# Patient Record
Sex: FEMALE | Race: BLACK OR AFRICAN AMERICAN | Employment: FULL TIME | URBAN - METROPOLITAN AREA
[De-identification: names, ages, dates, MRNs, and addresses within clinical notes are randomized per-mention and may not be internally consistent; named-entity substitution may affect disease eponyms.]

---

## 2017-01-13 ENCOUNTER — HISTORICAL (OUTPATIENT)
Dept: LAB | Facility: HOSPITAL | Age: 32
End: 2017-01-13

## 2017-04-21 ENCOUNTER — HISTORICAL (OUTPATIENT)
Dept: LAB | Facility: HOSPITAL | Age: 32
End: 2017-04-21

## 2017-08-16 ENCOUNTER — HISTORICAL (OUTPATIENT)
Dept: LAB | Facility: HOSPITAL | Age: 32
End: 2017-08-16

## 2017-08-16 LAB
ABS NEUT (OLG): 4.71 X10(3)/MCL (ref 2.1–9.2)
ALBUMIN SERPL-MCNC: 3.9 GM/DL (ref 3.4–5)
ALBUMIN/GLOB SERPL: 1.1 {RATIO}
ALP SERPL-CCNC: 89 UNIT/L (ref 38–126)
ALT SERPL-CCNC: 20 UNIT/L (ref 12–78)
APPEARANCE, UA: CLEAR
AST SERPL-CCNC: 17 UNIT/L (ref 15–37)
BACTERIA SPEC CULT: ABNORMAL /HPF
BASOPHILS # BLD AUTO: 0 X10(3)/MCL (ref 0–0.2)
BASOPHILS NFR BLD AUTO: 0 %
BILIRUB SERPL-MCNC: 0.5 MG/DL (ref 0.2–1)
BILIRUB UR QL STRIP: NEGATIVE
BILIRUBIN DIRECT+TOT PNL SERPL-MCNC: 0.1 MG/DL (ref 0–0.2)
BILIRUBIN DIRECT+TOT PNL SERPL-MCNC: 0.4 MG/DL (ref 0–0.8)
BUN SERPL-MCNC: 16 MG/DL (ref 7–18)
CALCIUM SERPL-MCNC: 8.4 MG/DL (ref 8.5–10.1)
CHLORIDE SERPL-SCNC: 102 MMOL/L (ref 98–107)
CHOLEST SERPL-MCNC: 172 MG/DL (ref 0–200)
CHOLEST/HDLC SERPL: 3.5 {RATIO} (ref 0–4)
CO2 SERPL-SCNC: 27 MMOL/L (ref 21–32)
COLOR UR: YELLOW
CREAT SERPL-MCNC: 0.66 MG/DL (ref 0.55–1.02)
DEPRECATED CALCIDIOL+CALCIFEROL SERPL-MC: 32.06 NG/ML (ref 30–80)
EOSINOPHIL # BLD AUTO: 0.1 X10(3)/MCL (ref 0–0.9)
EOSINOPHIL NFR BLD AUTO: 1 %
ERYTHROCYTE [DISTWIDTH] IN BLOOD BY AUTOMATED COUNT: 12.3 % (ref 11.5–17)
EST. AVERAGE GLUCOSE BLD GHB EST-MCNC: 105 MG/DL
GLOBULIN SER-MCNC: 3.7 GM/DL (ref 2.4–3.5)
GLUCOSE (UA): NEGATIVE
GLUCOSE SERPL-MCNC: 83 MG/DL (ref 74–106)
HBA1C MFR BLD: 5.3 % (ref 4.2–6.3)
HCT VFR BLD AUTO: 40.4 % (ref 37–47)
HDLC SERPL-MCNC: 49 MG/DL (ref 35–60)
HGB BLD-MCNC: 13.2 GM/DL (ref 12–16)
HGB UR QL STRIP: ABNORMAL
KETONES UR QL STRIP: NEGATIVE
LDLC SERPL CALC-MCNC: 94 MG/DL (ref 0–129)
LEUKOCYTE ESTERASE UR QL STRIP: NEGATIVE
LYMPHOCYTES # BLD AUTO: 2.3 X10(3)/MCL (ref 0.6–4.6)
LYMPHOCYTES NFR BLD AUTO: 30 %
MAGNESIUM SERPL-MCNC: 1.8 MG/DL (ref 1.8–2.4)
MCH RBC QN AUTO: 27 PG (ref 27–31)
MCHC RBC AUTO-ENTMCNC: 32.7 GM/DL (ref 33–36)
MCV RBC AUTO: 82.8 FL (ref 80–94)
MONOCYTES # BLD AUTO: 0.5 X10(3)/MCL (ref 0.1–1.3)
MONOCYTES NFR BLD AUTO: 6 %
NEUTROPHILS # BLD AUTO: 4.71 X10(3)/MCL (ref 2.1–9.2)
NEUTROPHILS NFR BLD AUTO: 62 %
NITRITE UR QL STRIP: NEGATIVE
PH UR STRIP: 6 [PH] (ref 5–9)
PHOSPHATE SERPL-MCNC: 3.4 MG/DL (ref 2.5–4.9)
PLATELET # BLD AUTO: 306 X10(3)/MCL (ref 130–400)
PMV BLD AUTO: 11.4 FL (ref 9.4–12.4)
POTASSIUM SERPL-SCNC: 3.7 MMOL/L (ref 3.5–5.1)
PROT SERPL-MCNC: 7.6 GM/DL (ref 6.4–8.2)
PROT UR QL STRIP: NEGATIVE
RBC # BLD AUTO: 4.88 X10(6)/MCL (ref 4.2–5.4)
RBC #/AREA URNS HPF: 25 /HPF (ref 0–2)
SODIUM SERPL-SCNC: 136 MMOL/L (ref 136–145)
SP GR UR STRIP: 1.02 (ref 1–1.03)
SQUAMOUS EPITHELIAL, UA: ABNORMAL
TRIGL SERPL-MCNC: 144 MG/DL (ref 30–150)
TSH SERPL-ACNC: 1.09 MIU/ML (ref 0.36–3.74)
UROBILINOGEN UR STRIP-ACNC: 0.2
VIT B12 SERPL-MCNC: 878 PG/ML (ref 193–986)
VLDLC SERPL CALC-MCNC: 29 MG/DL
WBC # SPEC AUTO: 7.6 X10(3)/MCL (ref 4.5–11.5)
WBC #/AREA URNS HPF: ABNORMAL /[HPF]

## 2018-08-30 ENCOUNTER — HISTORICAL (OUTPATIENT)
Dept: LAB | Facility: HOSPITAL | Age: 33
End: 2018-08-30

## 2018-08-30 LAB
ABS NEUT (OLG): 6.21 X10(3)/MCL (ref 2.1–9.2)
ALBUMIN SERPL-MCNC: 4 GM/DL (ref 3.4–5)
ALBUMIN/GLOB SERPL: 1.1 {RATIO}
ALP SERPL-CCNC: 92 UNIT/L (ref 38–126)
ALT SERPL-CCNC: 20 UNIT/L (ref 12–78)
APPEARANCE, UA: CLEAR
AST SERPL-CCNC: 24 UNIT/L (ref 15–37)
BACTERIA SPEC CULT: NORMAL /HPF
BASOPHILS # BLD AUTO: 0 X10(3)/MCL (ref 0–0.2)
BASOPHILS NFR BLD AUTO: 0 %
BILIRUB SERPL-MCNC: 0.5 MG/DL (ref 0.2–1)
BILIRUB UR QL STRIP: NEGATIVE
BILIRUBIN DIRECT+TOT PNL SERPL-MCNC: 0.1 MG/DL (ref 0–0.2)
BILIRUBIN DIRECT+TOT PNL SERPL-MCNC: 0.4 MG/DL (ref 0–0.8)
BUN SERPL-MCNC: 15 MG/DL (ref 7–18)
CALCIUM SERPL-MCNC: 9.4 MG/DL (ref 8.5–10.1)
CHLORIDE SERPL-SCNC: 102 MMOL/L (ref 98–107)
CHOLEST SERPL-MCNC: 150 MG/DL (ref 0–200)
CHOLEST/HDLC SERPL: 3.8 {RATIO} (ref 0–4)
CO2 SERPL-SCNC: 27 MMOL/L (ref 21–32)
COLOR UR: YELLOW
CREAT SERPL-MCNC: 0.75 MG/DL (ref 0.55–1.02)
DEPRECATED CALCIDIOL+CALCIFEROL SERPL-MC: 25.06 NG/ML (ref 30–80)
EOSINOPHIL # BLD AUTO: 0 X10(3)/MCL (ref 0–0.9)
EOSINOPHIL NFR BLD AUTO: 0 %
ERYTHROCYTE [DISTWIDTH] IN BLOOD BY AUTOMATED COUNT: 13.6 % (ref 11.5–17)
EST. AVERAGE GLUCOSE BLD GHB EST-MCNC: 100 MG/DL
GLOBULIN SER-MCNC: 3.5 GM/DL (ref 2.4–3.5)
GLUCOSE (UA): NEGATIVE
GLUCOSE SERPL-MCNC: 75 MG/DL (ref 74–106)
HAV IGM SERPL QL IA: NEGATIVE
HBA1C MFR BLD: 5.1 % (ref 4.2–6.3)
HBV CORE IGM SERPL QL IA: NEGATIVE
HBV SURFACE AG SERPL QL IA: NEGATIVE
HCT VFR BLD AUTO: 41 % (ref 37–47)
HCV AB SERPL QL IA: NEGATIVE
HDLC SERPL-MCNC: 40 MG/DL (ref 35–60)
HEPATITIS PANEL INTERP: NORMAL
HGB BLD-MCNC: 13 GM/DL (ref 12–16)
HGB UR QL STRIP: NEGATIVE
HIV 1+2 AB+HIV1 P24 AG SERPL QL IA: NEGATIVE
KETONES UR QL STRIP: NEGATIVE
LDLC SERPL CALC-MCNC: 81 MG/DL (ref 0–129)
LEUKOCYTE ESTERASE UR QL STRIP: NEGATIVE
LYMPHOCYTES # BLD AUTO: 2.5 X10(3)/MCL (ref 0.6–4.6)
LYMPHOCYTES NFR BLD AUTO: 26 %
MCH RBC QN AUTO: 26.6 PG (ref 27–31)
MCHC RBC AUTO-ENTMCNC: 31.7 GM/DL (ref 33–36)
MCV RBC AUTO: 83.8 FL (ref 80–94)
MONOCYTES # BLD AUTO: 0.6 X10(3)/MCL (ref 0.1–1.3)
MONOCYTES NFR BLD AUTO: 7 %
NEUTROPHILS # BLD AUTO: 6.21 X10(3)/MCL (ref 1.4–7.9)
NEUTROPHILS NFR BLD AUTO: 66 %
NITRITE UR QL STRIP: NEGATIVE
PH UR STRIP: 5 [PH] (ref 5–9)
PLATELET # BLD AUTO: 272 X10(3)/MCL (ref 130–400)
PMV BLD AUTO: 12 FL (ref 9.4–12.4)
POTASSIUM SERPL-SCNC: 4.2 MMOL/L (ref 3.5–5.1)
PROT SERPL-MCNC: 7.5 GM/DL (ref 6.4–8.2)
PROT UR QL STRIP: NEGATIVE
RBC # BLD AUTO: 4.89 X10(6)/MCL (ref 4.2–5.4)
RBC #/AREA URNS HPF: NORMAL /[HPF]
RPR SER QL: NORMAL
SODIUM SERPL-SCNC: 137 MMOL/L (ref 136–145)
SP GR UR STRIP: 1.01 (ref 1–1.03)
SQUAMOUS EPITHELIAL, UA: NORMAL
TRIGL SERPL-MCNC: 146 MG/DL (ref 30–150)
TSH SERPL-ACNC: 1.31 MIU/L (ref 0.36–3.74)
UROBILINOGEN UR STRIP-ACNC: 0.2
VLDLC SERPL CALC-MCNC: 29 MG/DL
WBC # SPEC AUTO: 9.5 X10(3)/MCL (ref 4.5–11.5)
WBC #/AREA URNS HPF: NORMAL /[HPF]

## 2019-03-07 ENCOUNTER — HISTORICAL (OUTPATIENT)
Dept: LAB | Facility: HOSPITAL | Age: 34
End: 2019-03-07

## 2019-03-07 LAB
APPEARANCE, UA: CLEAR
BACTERIA SPEC CULT: ABNORMAL /HPF
BILIRUB UR QL STRIP: NEGATIVE
COLOR UR: YELLOW
DEPRECATED CALCIDIOL+CALCIFEROL SERPL-MC: 19.2 NG/ML (ref 30–80)
GLUCOSE (UA): NEGATIVE
HGB UR QL STRIP: NEGATIVE
KETONES UR QL STRIP: NEGATIVE
LEUKOCYTE ESTERASE UR QL STRIP: ABNORMAL
NITRITE UR QL STRIP: NEGATIVE
PH UR STRIP: 6.5 [PH] (ref 5–9)
PROT UR QL STRIP: NEGATIVE
RBC #/AREA URNS HPF: ABNORMAL /[HPF]
SP GR UR STRIP: 1 (ref 1–1.03)
SQUAMOUS EPITHELIAL, UA: ABNORMAL
UROBILINOGEN UR STRIP-ACNC: 0.2
WBC #/AREA URNS HPF: ABNORMAL /[HPF]

## 2020-03-10 ENCOUNTER — HISTORICAL (OUTPATIENT)
Dept: LAB | Facility: HOSPITAL | Age: 35
End: 2020-03-10

## 2020-03-10 LAB
ABS NEUT (OLG): 4.64 X10(3)/MCL (ref 2.1–9.2)
ALBUMIN SERPL-MCNC: 3.5 GM/DL (ref 3.4–5)
ALBUMIN/GLOB SERPL: 1 RATIO (ref 1.1–2)
ALP SERPL-CCNC: 131 UNIT/L (ref 38–126)
ALT SERPL-CCNC: 32 UNIT/L (ref 12–78)
AST SERPL-CCNC: 21 UNIT/L (ref 15–37)
BASOPHILS # BLD AUTO: 0 X10(3)/MCL (ref 0–0.2)
BASOPHILS NFR BLD AUTO: 1 %
BILIRUB SERPL-MCNC: 0.3 MG/DL (ref 0.2–1)
BILIRUBIN DIRECT+TOT PNL SERPL-MCNC: 0.1 MG/DL (ref 0–0.5)
BILIRUBIN DIRECT+TOT PNL SERPL-MCNC: 0.2 MG/DL (ref 0–0.8)
BUN SERPL-MCNC: 13 MG/DL (ref 7–18)
CALCIUM SERPL-MCNC: 8.3 MG/DL (ref 8.5–10.1)
CHLORIDE SERPL-SCNC: 105 MMOL/L (ref 98–107)
CHOLEST SERPL-MCNC: 159 MG/DL (ref 0–200)
CHOLEST/HDLC SERPL: 3.9 {RATIO} (ref 0–4)
CO2 SERPL-SCNC: 29 MMOL/L (ref 21–32)
CREAT SERPL-MCNC: 0.75 MG/DL (ref 0.55–1.02)
DEPRECATED CALCIDIOL+CALCIFEROL SERPL-MC: 20.69 NG/ML (ref 30–80)
EOSINOPHIL # BLD AUTO: 0.1 X10(3)/MCL (ref 0–0.9)
EOSINOPHIL NFR BLD AUTO: 2 %
ERYTHROCYTE [DISTWIDTH] IN BLOOD BY AUTOMATED COUNT: 14.7 % (ref 11.5–17)
EST. AVERAGE GLUCOSE BLD GHB EST-MCNC: 111 MG/DL
GLOBULIN SER-MCNC: 3.6 GM/DL (ref 2.4–3.5)
GLUCOSE SERPL-MCNC: 108 MG/DL (ref 74–106)
HBA1C MFR BLD: 5.5 % (ref 4.2–6.3)
HCT VFR BLD AUTO: 40.4 % (ref 37–47)
HDLC SERPL-MCNC: 41 MG/DL (ref 35–60)
HGB BLD-MCNC: 12.6 GM/DL (ref 12–16)
IMM GRANULOCYTES # BLD AUTO: 0 10*3/UL
IMM GRANULOCYTES NFR BLD AUTO: 0 %
LDLC SERPL CALC-MCNC: 84 MG/DL (ref 0–129)
LYMPHOCYTES # BLD AUTO: 2.1 X10(3)/MCL (ref 0.6–4.6)
LYMPHOCYTES NFR BLD AUTO: 28 %
MCH RBC QN AUTO: 25.8 PG (ref 27–31)
MCHC RBC AUTO-ENTMCNC: 31.2 GM/DL (ref 33–36)
MCV RBC AUTO: 82.8 FL (ref 80–94)
MONOCYTES # BLD AUTO: 0.5 X10(3)/MCL (ref 0.1–1.3)
MONOCYTES NFR BLD AUTO: 7 %
NEUTROPHILS # BLD AUTO: 4.64 X10(3)/MCL (ref 2.1–9.2)
NEUTROPHILS NFR BLD AUTO: 63 %
PLATELET # BLD AUTO: 328 X10(3)/MCL (ref 130–400)
PMV BLD AUTO: 11.7 FL (ref 9.4–12.4)
POTASSIUM SERPL-SCNC: 4 MMOL/L (ref 3.5–5.1)
PROT SERPL-MCNC: 7.1 GM/DL (ref 6.4–8.2)
RBC # BLD AUTO: 4.88 X10(6)/MCL (ref 4.2–5.4)
SODIUM SERPL-SCNC: 139 MMOL/L (ref 136–145)
TRIGL SERPL-MCNC: 172 MG/DL (ref 30–150)
TSH SERPL-ACNC: 0.38 MIU/L (ref 0.36–3.74)
VLDLC SERPL CALC-MCNC: 34 MG/DL
WBC # SPEC AUTO: 7.4 X10(3)/MCL (ref 4.5–11.5)

## 2020-05-22 ENCOUNTER — HISTORICAL (OUTPATIENT)
Dept: ADMINISTRATIVE | Facility: HOSPITAL | Age: 35
End: 2020-05-22

## 2020-05-22 LAB
APPEARANCE, UA: CLEAR
BACTERIA SPEC CULT: NORMAL /HPF
BILIRUB UR QL STRIP: NEGATIVE
COLOR UR: YELLOW
GLUCOSE (UA): NEGATIVE
HGB UR QL STRIP: NEGATIVE
KETONES UR QL STRIP: NEGATIVE
LEUKOCYTE ESTERASE UR QL STRIP: NEGATIVE
NITRITE UR QL STRIP: NEGATIVE
PH UR STRIP: 5.5 [PH] (ref 5–9)
PROT UR QL STRIP: NEGATIVE
RBC #/AREA URNS HPF: NORMAL /[HPF]
SP GR UR STRIP: 1.01 (ref 1–1.03)
SQUAMOUS EPITHELIAL, UA: NORMAL
UROBILINOGEN UR STRIP-ACNC: 0.2
WBC #/AREA URNS HPF: NORMAL /[HPF]

## 2021-05-18 ENCOUNTER — HISTORICAL (OUTPATIENT)
Dept: ADMINISTRATIVE | Facility: HOSPITAL | Age: 36
End: 2021-05-18

## 2021-05-18 LAB
ABS NEUT (OLG): 4.14 X10(3)/MCL (ref 2.1–9.2)
ALBUMIN SERPL-MCNC: 4 GM/DL (ref 3.5–5)
ALBUMIN/GLOB SERPL: 1.4 RATIO (ref 1.1–2)
ALP SERPL-CCNC: 70 UNIT/L (ref 40–150)
ALT SERPL-CCNC: 16 UNIT/L (ref 0–55)
APPEARANCE, UA: CLEAR
AST SERPL-CCNC: 23 UNIT/L (ref 5–34)
BACTERIA SPEC CULT: NORMAL /HPF
BASOPHILS # BLD AUTO: 0 X10(3)/MCL (ref 0–0.2)
BASOPHILS NFR BLD AUTO: 1 %
BILIRUB SERPL-MCNC: 0.6 MG/DL
BILIRUB UR QL STRIP: NEGATIVE
BILIRUBIN DIRECT+TOT PNL SERPL-MCNC: 0.2 MG/DL (ref 0–0.5)
BILIRUBIN DIRECT+TOT PNL SERPL-MCNC: 0.4 MG/DL (ref 0–0.8)
BUN SERPL-MCNC: 11.9 MG/DL (ref 7–18.7)
CALCIUM SERPL-MCNC: 9.1 MG/DL (ref 8.4–10.2)
CHLORIDE SERPL-SCNC: 107 MMOL/L (ref 98–107)
CHOLEST SERPL-MCNC: 158 MG/DL
CHOLEST/HDLC SERPL: 4 {RATIO} (ref 0–5)
CO2 SERPL-SCNC: 25 MMOL/L (ref 22–29)
COLOR UR: YELLOW
CREAT SERPL-MCNC: 0.81 MG/DL (ref 0.55–1.02)
DEPRECATED CALCIDIOL+CALCIFEROL SERPL-MC: 47.3 NG/ML (ref 30–80)
EOSINOPHIL # BLD AUTO: 0.1 X10(3)/MCL (ref 0–0.9)
EOSINOPHIL NFR BLD AUTO: 1 %
ERYTHROCYTE [DISTWIDTH] IN BLOOD BY AUTOMATED COUNT: 14.2 % (ref 11.5–17)
EST. AVERAGE GLUCOSE BLD GHB EST-MCNC: 99.7 MG/DL
GLOBULIN SER-MCNC: 2.9 GM/DL (ref 2.4–3.5)
GLUCOSE (UA): NEGATIVE
GLUCOSE SERPL-MCNC: 93 MG/DL (ref 74–100)
HBA1C MFR BLD: 5.1 %
HCT VFR BLD AUTO: 39.1 % (ref 37–47)
HDLC SERPL-MCNC: 43 MG/DL (ref 35–60)
HGB BLD-MCNC: 12.7 GM/DL (ref 12–16)
HGB UR QL STRIP: NEGATIVE
KETONES UR QL STRIP: NEGATIVE
LDLC SERPL CALC-MCNC: 95 MG/DL (ref 50–140)
LEUKOCYTE ESTERASE UR QL STRIP: NEGATIVE
LYMPHOCYTES # BLD AUTO: 1.7 X10(3)/MCL (ref 0.6–4.6)
LYMPHOCYTES NFR BLD AUTO: 26 %
MCH RBC QN AUTO: 26.4 PG (ref 27–31)
MCHC RBC AUTO-ENTMCNC: 32.5 GM/DL (ref 33–36)
MCV RBC AUTO: 81.3 FL (ref 80–94)
MONOCYTES # BLD AUTO: 0.6 X10(3)/MCL (ref 0.1–1.3)
MONOCYTES NFR BLD AUTO: 9 %
NEUTROPHILS # BLD AUTO: 4.14 X10(3)/MCL (ref 2.1–9.2)
NEUTROPHILS NFR BLD AUTO: 64 %
NITRITE UR QL STRIP: NEGATIVE
PH UR STRIP: 7 [PH] (ref 5–9)
PLATELET # BLD AUTO: 297 X10(3)/MCL (ref 130–400)
PMV BLD AUTO: 12.4 FL (ref 9.4–12.4)
POTASSIUM SERPL-SCNC: 4.2 MMOL/L (ref 3.5–5.1)
PROT SERPL-MCNC: 6.9 GM/DL (ref 6.4–8.3)
PROT UR QL STRIP: NEGATIVE
RBC # BLD AUTO: 4.81 X10(6)/MCL (ref 4.2–5.4)
RBC #/AREA URNS HPF: NORMAL /[HPF]
SODIUM SERPL-SCNC: 141 MMOL/L (ref 136–145)
SP GR UR STRIP: 1.02 (ref 1–1.03)
SQUAMOUS EPITHELIAL, UA: NORMAL /HPF (ref 0–4)
TRIGL SERPL-MCNC: 100 MG/DL (ref 37–140)
TSH SERPL-ACNC: 1.33 UIU/ML (ref 0.35–4.94)
UROBILINOGEN UR STRIP-ACNC: 0.2
VLDLC SERPL CALC-MCNC: 20 MG/DL
WBC # SPEC AUTO: 6.5 X10(3)/MCL (ref 4.5–11.5)
WBC #/AREA URNS HPF: NORMAL /[HPF]

## 2021-05-25 ENCOUNTER — HISTORICAL (OUTPATIENT)
Dept: ADMINISTRATIVE | Facility: HOSPITAL | Age: 36
End: 2021-05-25

## 2021-07-21 ENCOUNTER — HISTORICAL (OUTPATIENT)
Dept: ADMINISTRATIVE | Facility: HOSPITAL | Age: 36
End: 2021-07-21

## 2021-09-17 ENCOUNTER — HISTORICAL (OUTPATIENT)
Dept: ADMINISTRATIVE | Facility: HOSPITAL | Age: 36
End: 2021-09-17

## 2022-01-05 LAB
INFLUENZA A ANTIGEN, POC: NEGATIVE
INFLUENZA B ANTIGEN, POC: NEGATIVE
SARS-COV-2 RNA RESP QL NAA+PROBE: NEGATIVE

## 2022-04-12 ENCOUNTER — HISTORICAL (OUTPATIENT)
Dept: ADMINISTRATIVE | Facility: HOSPITAL | Age: 37
End: 2022-04-12

## 2022-04-30 VITALS
HEIGHT: 60 IN | DIASTOLIC BLOOD PRESSURE: 86 MMHG | SYSTOLIC BLOOD PRESSURE: 121 MMHG | BODY MASS INDEX: 32.47 KG/M2 | WEIGHT: 165.38 LBS | OXYGEN SATURATION: 99 %

## 2022-05-17 ENCOUNTER — TELEPHONE (OUTPATIENT)
Dept: FAMILY MEDICINE | Facility: CLINIC | Age: 37
End: 2022-05-17

## 2022-05-17 ENCOUNTER — CLINICAL SUPPORT (OUTPATIENT)
Dept: FAMILY MEDICINE | Facility: CLINIC | Age: 37
End: 2022-05-17
Payer: COMMERCIAL

## 2022-05-17 DIAGNOSIS — Z00.00 WELLNESS EXAMINATION: Primary | ICD-10-CM

## 2022-05-17 LAB
ALBUMIN SERPL-MCNC: 4.1 GM/DL (ref 3.5–5)
ALBUMIN/GLOB SERPL: 1.4 RATIO (ref 1.1–2)
ALP SERPL-CCNC: 65 UNIT/L (ref 40–150)
ALT SERPL-CCNC: 16 UNIT/L (ref 0–55)
APPEARANCE UR: CLEAR
AST SERPL-CCNC: 24 UNIT/L (ref 5–34)
BACTERIA #/AREA URNS AUTO: ABNORMAL /HPF
BASOPHILS # BLD AUTO: 0.03 X10(3)/MCL (ref 0–0.2)
BASOPHILS NFR BLD AUTO: 0.5 %
BILIRUB UR QL STRIP.AUTO: NEGATIVE MG/DL
BILIRUBIN DIRECT+TOT PNL SERPL-MCNC: 0.6 MG/DL
BUN SERPL-MCNC: 13.2 MG/DL (ref 7–18.7)
CALCIUM SERPL-MCNC: 9.1 MG/DL (ref 8.4–10.2)
CHLORIDE SERPL-SCNC: 102 MMOL/L (ref 98–107)
CO2 SERPL-SCNC: 29 MMOL/L (ref 22–29)
COLOR UR AUTO: YELLOW
CREAT SERPL-MCNC: 0.89 MG/DL (ref 0.55–1.02)
EOSINOPHIL # BLD AUTO: 0.04 X10(3)/MCL (ref 0–0.9)
EOSINOPHIL NFR BLD AUTO: 0.7 %
ERYTHROCYTE [DISTWIDTH] IN BLOOD BY AUTOMATED COUNT: 13.2 % (ref 11.5–17)
EST. AVERAGE GLUCOSE BLD GHB EST-MCNC: 99.7 MG/DL
GLOBULIN SER-MCNC: 2.9 GM/DL (ref 2.4–3.5)
GLUCOSE SERPL-MCNC: 83 MG/DL (ref 74–100)
GLUCOSE UR QL STRIP.AUTO: NEGATIVE MG/DL
HBA1C MFR BLD: 5.1 %
HCT VFR BLD AUTO: 41.4 % (ref 37–47)
HGB BLD-MCNC: 13.1 GM/DL (ref 12–16)
IMM GRANULOCYTES # BLD AUTO: 0.01 X10(3)/MCL (ref 0–0.02)
IMM GRANULOCYTES NFR BLD AUTO: 0.2 % (ref 0–0.43)
KETONES UR QL STRIP.AUTO: NEGATIVE MG/DL
LEUKOCYTE ESTERASE UR QL STRIP.AUTO: NEGATIVE UNIT/L
LYMPHOCYTES # BLD AUTO: 1.51 X10(3)/MCL (ref 0.6–4.6)
LYMPHOCYTES NFR BLD AUTO: 27.3 %
MCH RBC QN AUTO: 26.6 PG (ref 27–31)
MCHC RBC AUTO-ENTMCNC: 31.6 MG/DL (ref 33–36)
MCV RBC AUTO: 84 FL (ref 80–94)
MONOCYTES # BLD AUTO: 0.37 X10(3)/MCL (ref 0.1–1.3)
MONOCYTES NFR BLD AUTO: 6.7 %
NEUTROPHILS # BLD AUTO: 3.6 X10(3)/MCL (ref 2.1–9.2)
NEUTROPHILS NFR BLD AUTO: 64.6 %
NITRITE UR QL STRIP.AUTO: NEGATIVE
NRBC BLD AUTO-RTO: 0 %
PH UR STRIP.AUTO: 8 [PH]
PLATELET # BLD AUTO: 296 X10(3)/MCL (ref 130–400)
PMV BLD AUTO: 12.3 FL (ref 9.4–12.4)
POTASSIUM SERPL-SCNC: 3.9 MMOL/L (ref 3.5–5.1)
PROT SERPL-MCNC: 7 GM/DL (ref 6.4–8.3)
PROT UR QL STRIP.AUTO: NEGATIVE MG/DL
RBC # BLD AUTO: 4.93 X10(6)/MCL (ref 4.2–5.4)
RBC #/AREA URNS AUTO: <5 /HPF
RBC UR QL AUTO: NEGATIVE UNIT/L
SODIUM SERPL-SCNC: 139 MMOL/L (ref 136–145)
SP GR UR STRIP.AUTO: 1.01 (ref 1–1.03)
SQUAMOUS #/AREA URNS AUTO: <4 /LPF
TSH SERPL-ACNC: 0.99 UIU/ML (ref 0.35–4.94)
UROBILINOGEN UR STRIP-ACNC: 0.2 MG/DL
WBC # SPEC AUTO: 5.5 X10(3)/MCL (ref 4.5–11.5)
WBC #/AREA URNS AUTO: <5 /HPF

## 2022-05-17 PROCEDURE — 85025 COMPLETE CBC W/AUTO DIFF WBC: CPT | Performed by: NURSE PRACTITIONER

## 2022-05-17 PROCEDURE — 80053 COMPREHEN METABOLIC PANEL: CPT | Performed by: NURSE PRACTITIONER

## 2022-05-17 PROCEDURE — 83036 HEMOGLOBIN GLYCOSYLATED A1C: CPT | Performed by: NURSE PRACTITIONER

## 2022-05-17 PROCEDURE — 36415 COLL VENOUS BLD VENIPUNCTURE: CPT

## 2022-05-17 PROCEDURE — 84443 ASSAY THYROID STIM HORMONE: CPT | Performed by: NURSE PRACTITIONER

## 2022-05-17 PROCEDURE — 81001 URINALYSIS AUTO W/SCOPE: CPT | Performed by: NURSE PRACTITIONER

## 2022-05-17 NOTE — PROGRESS NOTES
Patient ID: [id number]     Chief Complaint: Lab Collect      HPI:     Leta Mccarthy is a 37 y.o. female here today for a nurse visit lab draw. Orders reviewed.         No past medical history on file.     No past surgical history on file.    Review of patient's allergies indicates:  Not on File    No outpatient medications have been marked as taking for the 5/17/22 encounter (Appointment) with NURSE, [facility].       Social History     Socioeconomic History    Marital status: Single        No family history on file.     Patient Care Team:  Akilah Olivera MD as PCP - General (Family Medicine)       Subjective:       See HPI for details    Constitutional: Denies Change in appetite. Denies Chills. Denies Fever. Denies Night sweats.  Eye: Denies Blurred vision. Denies Eye pain.  Respiratory: Denies Cough. Denies Shortness of breath. Denies Shortness of breath with exertion. Denies Wheezing.  Cardiovascular: Denies Chest pain.  Denies Irregular heartbeat. Denies Palpitations. Denies Edema.   Genitourinary: Denies Dysuria. Denies Urinary frequency. Denies Urinary urgency.   Integumentary: Denies Rash. Denies Itching.   Neurologic: Denies Dizziness. Denies Fainting. Denies Headache.  Psychiatric: Denies Depression. Denies Anxiety. Denies Suicidal/Homicidal ideations.    All Other ROS: Negative except as stated in HPI.       Objective:       Venipuncture performed.  Tolerated well. No signs of bleeding or ecchymosis. Direct pressure applied following venipuncture. Bandage placed.       Assessment:     No diagnosis found.          Plan:     There are no diagnoses linked to this encounter.           No follow-ups on file. In addition to their scheduled follow up, the patient has also been instructed to follow up on as needed basis.

## 2022-05-25 ENCOUNTER — OFFICE VISIT (OUTPATIENT)
Dept: FAMILY MEDICINE | Facility: CLINIC | Age: 37
End: 2022-05-25
Payer: COMMERCIAL

## 2022-05-25 VITALS
OXYGEN SATURATION: 98 % | WEIGHT: 168 LBS | RESPIRATION RATE: 18 BRPM | SYSTOLIC BLOOD PRESSURE: 118 MMHG | HEIGHT: 60 IN | TEMPERATURE: 98 F | DIASTOLIC BLOOD PRESSURE: 76 MMHG | HEART RATE: 71 BPM | BODY MASS INDEX: 32.98 KG/M2

## 2022-05-25 DIAGNOSIS — R61 NIGHT SWEATS: ICD-10-CM

## 2022-05-25 DIAGNOSIS — E55.9 VITAMIN D DEFICIENCY: ICD-10-CM

## 2022-05-25 DIAGNOSIS — Z80.3 FAMILY HISTORY OF BREAST CANCER: ICD-10-CM

## 2022-05-25 DIAGNOSIS — M54.12 CERVICAL RADICULOPATHY: ICD-10-CM

## 2022-05-25 DIAGNOSIS — Z00.00 ENCOUNTER FOR PREVENTATIVE ADULT HEALTH CARE EXAMINATION: Primary | ICD-10-CM

## 2022-05-25 DIAGNOSIS — R82.71 BACTERIURIA: ICD-10-CM

## 2022-05-25 DIAGNOSIS — Z12.31 BREAST CANCER SCREENING BY MAMMOGRAM: ICD-10-CM

## 2022-05-25 PROBLEM — G54.2 DISORDER OF RIGHT CERVICAL NERVE ROOT: Status: ACTIVE | Noted: 2022-05-25

## 2022-05-25 PROBLEM — E66.9 OBESITY: Chronic | Status: ACTIVE | Noted: 2022-05-25

## 2022-05-25 PROBLEM — M54.50 LOW BACK PAIN: Status: ACTIVE | Noted: 2022-05-25

## 2022-05-25 PROBLEM — E66.9 OBESITY: Status: ACTIVE | Noted: 2022-05-25

## 2022-05-25 PROBLEM — G43.009 MIGRAINE WITHOUT AURA AND RESPONSIVE TO TREATMENT: Status: ACTIVE | Noted: 2022-05-25

## 2022-05-25 PROBLEM — G54.2 DISORDER OF RIGHT CERVICAL NERVE ROOT: Status: RESOLVED | Noted: 2022-05-25 | Resolved: 2022-05-25

## 2022-05-25 LAB
APPEARANCE UR: CLEAR
BACTERIA #/AREA URNS AUTO: NORMAL /HPF
BILIRUB UR QL STRIP.AUTO: NEGATIVE MG/DL
CHOLEST SERPL-MCNC: 191 MG/DL
CHOLEST/HDLC SERPL: 4 {RATIO} (ref 0–5)
COLOR UR AUTO: YELLOW
DEPRECATED CALCIDIOL+CALCIFEROL SERPL-MC: 44.2 NG/ML (ref 30–80)
ESTRADIOL SERPL HS-MCNC: 123 PG/ML
FSH SERPL-ACNC: 2.33 MIU/ML
GLUCOSE UR QL STRIP.AUTO: NEGATIVE MG/DL
HDLC SERPL-MCNC: 54 MG/DL (ref 35–60)
KETONES UR QL STRIP.AUTO: NEGATIVE MG/DL
LDLC SERPL CALC-MCNC: 119 MG/DL (ref 50–140)
LEUKOCYTE ESTERASE UR QL STRIP.AUTO: ABNORMAL UNIT/L
LH SERPL-ACNC: 1.01 MIU/ML
NITRITE UR QL STRIP.AUTO: NEGATIVE
PH UR STRIP.AUTO: 7.5 [PH]
PROT UR QL STRIP.AUTO: NEGATIVE MG/DL
RBC #/AREA URNS AUTO: <5 /HPF
RBC UR QL AUTO: NEGATIVE UNIT/L
SP GR UR STRIP.AUTO: 1.01 (ref 1–1.03)
SQUAMOUS #/AREA URNS AUTO: <4 /LPF
TRIGL SERPL-MCNC: 88 MG/DL (ref 37–140)
UROBILINOGEN UR STRIP-ACNC: 0.2 MG/DL
VLDLC SERPL CALC-MCNC: 18 MG/DL
WBC #/AREA URNS AUTO: <5 /HPF

## 2022-05-25 PROCEDURE — 83001 ASSAY OF GONADOTROPIN (FSH): CPT | Performed by: FAMILY MEDICINE

## 2022-05-25 PROCEDURE — 1160F RVW MEDS BY RX/DR IN RCRD: CPT | Mod: CPTII,,, | Performed by: FAMILY MEDICINE

## 2022-05-25 PROCEDURE — 3078F PR MOST RECENT DIASTOLIC BLOOD PRESSURE < 80 MM HG: ICD-10-PCS | Mod: CPTII,,, | Performed by: FAMILY MEDICINE

## 2022-05-25 PROCEDURE — 1159F MED LIST DOCD IN RCRD: CPT | Mod: CPTII,,, | Performed by: FAMILY MEDICINE

## 2022-05-25 PROCEDURE — 83002 ASSAY OF GONADOTROPIN (LH): CPT | Performed by: FAMILY MEDICINE

## 2022-05-25 PROCEDURE — 3008F BODY MASS INDEX DOCD: CPT | Mod: CPTII,,, | Performed by: FAMILY MEDICINE

## 2022-05-25 PROCEDURE — 3008F PR BODY MASS INDEX (BMI) DOCUMENTED: ICD-10-PCS | Mod: CPTII,,, | Performed by: FAMILY MEDICINE

## 2022-05-25 PROCEDURE — 1160F PR REVIEW ALL MEDS BY PRESCRIBER/CLIN PHARMACIST DOCUMENTED: ICD-10-PCS | Mod: CPTII,,, | Performed by: FAMILY MEDICINE

## 2022-05-25 PROCEDURE — 80061 LIPID PANEL: CPT | Performed by: FAMILY MEDICINE

## 2022-05-25 PROCEDURE — 99395 PREV VISIT EST AGE 18-39: CPT | Mod: ,,, | Performed by: FAMILY MEDICINE

## 2022-05-25 PROCEDURE — 84270 ASSAY OF SEX HORMONE GLOBUL: CPT | Performed by: FAMILY MEDICINE

## 2022-05-25 PROCEDURE — 99395 PR PREVENTIVE VISIT,EST,18-39: ICD-10-PCS | Mod: ,,, | Performed by: FAMILY MEDICINE

## 2022-05-25 PROCEDURE — 82670 ASSAY OF TOTAL ESTRADIOL: CPT | Performed by: FAMILY MEDICINE

## 2022-05-25 PROCEDURE — 1159F PR MEDICATION LIST DOCUMENTED IN MEDICAL RECORD: ICD-10-PCS | Mod: CPTII,,, | Performed by: FAMILY MEDICINE

## 2022-05-25 PROCEDURE — 36415 COLL VENOUS BLD VENIPUNCTURE: CPT | Performed by: FAMILY MEDICINE

## 2022-05-25 PROCEDURE — 3078F DIAST BP <80 MM HG: CPT | Mod: CPTII,,, | Performed by: FAMILY MEDICINE

## 2022-05-25 PROCEDURE — 3074F SYST BP LT 130 MM HG: CPT | Mod: CPTII,,, | Performed by: FAMILY MEDICINE

## 2022-05-25 PROCEDURE — 3074F PR MOST RECENT SYSTOLIC BLOOD PRESSURE < 130 MM HG: ICD-10-PCS | Mod: CPTII,,, | Performed by: FAMILY MEDICINE

## 2022-05-25 PROCEDURE — 82306 VITAMIN D 25 HYDROXY: CPT | Performed by: FAMILY MEDICINE

## 2022-05-25 PROCEDURE — 81001 URINALYSIS AUTO W/SCOPE: CPT | Performed by: FAMILY MEDICINE

## 2022-05-25 RX ORDER — ACETAMINOPHEN 500 MG
2000 TABLET ORAL DAILY
COMMUNITY

## 2022-05-25 RX ORDER — MULTIVITAMIN
1 TABLET ORAL DAILY
COMMUNITY

## 2022-05-25 RX ORDER — AMOXICILLIN 500 MG
2 CAPSULE ORAL DAILY
COMMUNITY

## 2022-05-25 NOTE — PROGRESS NOTES
Patient ID: [id number]     Chief Complaint: Annual Exam        HPI:     Leta Mccarthy is a 37 y.o. female with history of cervical radiculopathy, vitamin-D deficiency, and obesity here today for an annual wellness visit.     Cervical radiculopathy- The patient states that she completed physical therapy as instructed however symptoms recurred and seems to have worsened.  She admits to aching pain and intermittent throbbing in the inferior neck that radiates into the right shoulder.  She denies any numbness or tingling.  Patient had x-ray shoulder and x-ray cervical spine that did not show any acute osseous abnormality however narrowing of lordotic curve and mild disc space narrowing was mentioned.  The patient states that she is not interested in starting any medications for the pain and has been using warm compresses as well as topical Voltaren gel.  History of vitamin-D deficiency- The patient states that she is currently taking vitamin D3 2000 units with supplemental calcium daily.  Last vitamin-D in 2021 was within normal limits.  Patient requests repeat vitamin-D level.  Obesity- The patient states that she is currently exercising 3 times per week and states that she eats a well-balanced diet low in carbohydrates and increase her vegetable intake.  The patient states she also drinks mostly water.    New concerns:  The patient states that she has had night sweats intermittently for the last few months and has had increase in mood swings approximately 1 week prior to menses.  She states that LMP was 05/07/2022 and is mostly regular.  Patient is concerned about premenopausal state.     Preventative Health:  Colorectal cancer screening -  Due at age 47  Breast cancer screening - Admits to significant family hx of breast cancer in 2 maternal aunts and half sister (diagnosed in early 40s). Mammogram ordered today.  Cervical cancer screening - Last pap smear 2020 and WNL, patient has appointment with gynecology this  year.  Osteoporosis screening - Not yet due  Tobacco use - Denies  Alcohol use - Rare/Socially per patient  Caffeine intake - 1 cup of coffee 5 days a week, denies consumption of soft drinks.  Eye exam - Last eye exam 2022  Dental exam - Last dental exam 2022  Vaccinations -  Declines COVID-19 vaccination and not interested in discussing.  Immunization History   Administered Date(s) Administered    Influenza - Quadrivalent - PF *Preferred* (6 months and older) 2019    Tdap 2016        Past Medical History:  Cervical radiculopathy  Migraine without aura and responsive to treatment  Obesity  Vitamin D deficiency     Past Surgical History:   Procedure Laterality Date     SECTION       SECTION      HERNIA REPAIR         Review of patient's allergies indicates:  No Known Allergies    Outpatient Medications Marked as Taking for the 22 encounter (Office Visit) with PROVIDER[facility]   Medication Sig Dispense Refill    cholecalciferol, vitamin D3, (VITAMIN D3) 50 mcg (2,000 unit) Cap capsule Take 2,000 Int'l Units by mouth once daily.      multivitamin with folic acid 400 mcg Tab Take 1 tablet by mouth once daily.      omega-3 fatty acids/fish oil (FISH OIL-OMEGA-3 FATTY ACIDS) 300-1,000 mg capsule Take 2 g by mouth once daily.         Social History     Socioeconomic History    Marital status: Single   Tobacco Use    Smoking status: Never Smoker    Smokeless tobacco: Never Used   Substance and Sexual Activity    Alcohol use: Not Currently    Drug use: Never    Sexual activity: Yes        Family History   Problem Relation Age of Onset    Hypertension Mother     Osteoarthritis Mother     Hypertension Brother     Hypertension Brother         Lab Results   Component Value Date    WBC 5.5 2022    HGB 13.1 2022    HCT 41.4 2022     2022    CHOL 158 2021    TRIG 100 2021    HDL 43 2021    ALT 16 2022    AST  24 05/17/2022     05/17/2022    K 3.9 05/17/2022    CREATININE 0.89 05/17/2022    BUN 13.2 05/17/2022    CO2 29 05/17/2022    TSH 0.9898 05/17/2022    HGBA1C 5.1 05/17/2022            Subjective:     Review of Systems:   Review of Systems   Constitutional: Negative for chills, fever and weight loss.   HENT: Negative for sore throat.    Eyes: Negative for blurred vision and double vision.   Respiratory: Negative for cough and shortness of breath.    Cardiovascular: Negative for chest pain.   Gastrointestinal: Negative for diarrhea, nausea and vomiting.   Genitourinary: Negative for dysuria, frequency and urgency.   Musculoskeletal: Negative for myalgias.   Skin: Negative for rash.   Neurological: Positive for headaches. Negative for dizziness, tremors, seizures and weakness.   Endo/Heme/Allergies: Negative for polydipsia.   Psychiatric/Behavioral: Negative for suicidal ideas. The patient is not nervous/anxious.    All other systems reviewed and are negative.       See HPI for details    Objective:     /76 (BP Location: Left arm, Patient Position: Sitting, BP Method: Medium (Manual))   Pulse 71   Temp 98.1 °F (36.7 °C) (Oral)   Resp 18   Ht 5' (1.524 m)   Wt 76.2 kg (168 lb)   LMP 05/01/2022   SpO2 98%   BMI 32.81 kg/m²     Physical Exam:  Physical Exam  Constitutional:       General: She is not in acute distress.     Appearance: Normal appearance. She is normal weight.   HENT:      Head: Normocephalic and atraumatic.      Right Ear: Tympanic membrane, ear canal and external ear normal.      Left Ear: Tympanic membrane, ear canal and external ear normal.      Mouth/Throat:      Mouth: Mucous membranes are moist.      Pharynx: Oropharynx is clear. No oropharyngeal exudate or posterior oropharyngeal erythema.   Eyes:      Extraocular Movements: Extraocular movements intact.      Conjunctiva/sclera: Conjunctivae normal.      Pupils: Pupils are equal, round, and reactive to light.   Cardiovascular:       Rate and Rhythm: Normal rate and regular rhythm.      Pulses: Normal pulses.      Heart sounds: Normal heart sounds. No murmur heard.    No gallop.   Pulmonary:      Effort: Pulmonary effort is normal. No respiratory distress.      Breath sounds: Normal breath sounds. No wheezing or rhonchi.   Abdominal:      General: Bowel sounds are normal. There is no distension.      Palpations: Abdomen is soft. There is no mass.      Tenderness: There is no abdominal tenderness. There is no guarding or rebound.   Musculoskeletal:         General: Normal range of motion.      Cervical back: Normal range of motion and neck supple. Tenderness present. No edema, erythema, rigidity, torticollis or crepitus. Muscular tenderness present. No pain with movement. Normal range of motion.   Skin:     General: Skin is warm and dry.   Neurological:      General: No focal deficit present.      Mental Status: She is alert and oriented to person, place, and time. Mental status is at baseline.      Motor: Motor function is intact. No weakness.   Psychiatric:         Mood and Affect: Mood normal.         Thought Content: Thought content normal.         Judgment: Judgment normal.         Assessment:       ICD-10-CM ICD-9-CM   1. Encounter for preventative adult health care examination  Z00.00 V70.0   2. Cervical radiculopathy  M54.12 723.4   3. Night sweats  R61 780.8   4. Vitamin D deficiency  E55.9 268.9   5. Bacteriuria  R82.71 791.9   6. Breast cancer screening by mammogram  Z12.31 V76.12   7. Family history of breast cancer  Z80.3 V16.3        Plan:       Health Maintenance Topics with due status: Not Due       Topic Last Completion Date    TETANUS VACCINE 07/06/2016    Influenza Vaccine 09/04/2019    Cervical Cancer Screening 09/15/2021        1. Encounter for preventative adult health care examination  - Lipid Panel; Future  Recommend annual eye exam and biannual dental exams.  Limit caffeine and alcohol intake.  Well balanced diet low in  sugar and increased vegetables encouraged.  Moderate intensity exercise 30 min/day at least 5 days/Wk (total 150 min/Wk) recommended.  Maintain healthy BMI which may include weight loss.  Wellness labs ordered/resulted above.  See above preventative health screening at today's visit.    2. Cervical radiculopathy  Patient declines Rx for muscle relaxer at this time.  Completed PT with recurrence in symptoms.  Warm compresses as needed.  Home exercise program encouraged. Patient given printed information on stretching and muscle strengthening exercises.  Will consider MRI if no improvement in symptoms.  Referral to Ortho.   XR cervical spine in 2021 showed mild disc space narrowing and straightening of lordotic curve.  XR right shoulder in 2021 showed dextroscoliosis but no acute osseous abnormality.     3. Night sweats  - Estradiol; Future  - Follicle Stimulating Hormone; Future  - Luteinizing Hormone; Future  - Testosterone, Free & Total, Female or Child; Future  Keep follow up with gynecology  Patient will be notified of results of above lab work.  Patient also admits to mood changes associated with menses.    4. Vitamin D deficiency  - Vitamin D; Future    5. Bacteriuria  - Urinalysis, Reflex to Urine Culture Urine, Clean Catch; Future    6. Breast cancer screening by mammogram  - Mammo Digital Screening Bilat; Future    7. Family history of breast cancer  - Mammo Digital Screening Bilat; Future      Follow up in about 6 months (around 11/25/2022) for Obesity.

## 2022-05-27 ENCOUNTER — TELEPHONE (OUTPATIENT)
Dept: FAMILY MEDICINE | Facility: CLINIC | Age: 37
End: 2022-05-27
Payer: COMMERCIAL

## 2022-05-27 NOTE — TELEPHONE ENCOUNTER
----- Message from Sia Olson MD sent at 5/27/2022 11:33 AM CDT -----  Patient hormone labs essentially normal however still awaiting results of testosterone. LDL not at goal recommend low fat low cholesterol diet. No bacteria seen on UA and vitamin D level normal.

## 2022-06-08 ENCOUNTER — HOSPITAL ENCOUNTER (OUTPATIENT)
Dept: RADIOLOGY | Facility: HOSPITAL | Age: 37
Discharge: HOME | End: 2022-06-08
Attending: FAMILY MEDICINE
Payer: COMMERCIAL

## 2022-06-08 DIAGNOSIS — Z12.31 BREAST CANCER SCREENING BY MAMMOGRAM: ICD-10-CM

## 2022-06-08 DIAGNOSIS — Z80.3 FAMILY HISTORY OF BREAST CANCER: ICD-10-CM

## 2022-06-08 LAB
SHBG SERPL-SCNC: 108 NMOL/L
TESTOST FREE SERPL-MCNC: 1.4 PG/ML
TESTOST SERPL-MCNC: 19 NG/DL

## 2022-06-08 PROCEDURE — 77063 BREAST TOMOSYNTHESIS BI: CPT | Mod: 26,,, | Performed by: RADIOLOGY

## 2022-06-08 PROCEDURE — 77067 SCR MAMMO BI INCL CAD: CPT | Mod: 26,,, | Performed by: RADIOLOGY

## 2022-06-08 PROCEDURE — 77063 MAMMO DIGITAL SCREENING BILAT WITH TOMO: ICD-10-PCS | Mod: 26,,, | Performed by: RADIOLOGY

## 2022-06-08 PROCEDURE — 77067 MAMMO DIGITAL SCREENING BILAT WITH TOMO: ICD-10-PCS | Mod: 26,,, | Performed by: RADIOLOGY

## 2022-06-08 PROCEDURE — 77067 SCR MAMMO BI INCL CAD: CPT | Mod: TC

## 2022-06-13 ENCOUNTER — TELEPHONE (OUTPATIENT)
Dept: FAMILY MEDICINE | Facility: CLINIC | Age: 37
End: 2022-06-13
Payer: COMMERCIAL

## 2022-06-13 DIAGNOSIS — Z91.89 AT HIGH RISK FOR BREAST CANCER: Primary | ICD-10-CM

## 2022-06-13 NOTE — TELEPHONE ENCOUNTER
----- Message from Akilah Olivera MD sent at 6/8/2022  4:55 PM CDT -----  Testosterone level is normal.

## 2022-06-13 NOTE — TELEPHONE ENCOUNTER
----- Message from Sia Olson MD sent at 6/13/2022  8:36 AM CDT -----  Mammogram negative for evidence of malignancy however due to breast density and family history of breast cancer, the patient is considered high risk. Referral to high risk breast clinic recommended. Referral sent.

## 2022-06-28 ENCOUNTER — TELEPHONE (OUTPATIENT)
Dept: FAMILY MEDICINE | Facility: CLINIC | Age: 37
End: 2022-06-28
Payer: COMMERCIAL

## 2022-07-21 ENCOUNTER — OFFICE VISIT (OUTPATIENT)
Dept: SURGERY | Facility: CLINIC | Age: 37
End: 2022-07-21
Payer: COMMERCIAL

## 2022-07-21 VITALS
WEIGHT: 171.38 LBS | RESPIRATION RATE: 18 BRPM | BODY MASS INDEX: 33.65 KG/M2 | SYSTOLIC BLOOD PRESSURE: 113 MMHG | TEMPERATURE: 98 F | OXYGEN SATURATION: 100 % | HEART RATE: 71 BPM | DIASTOLIC BLOOD PRESSURE: 79 MMHG | HEIGHT: 60 IN

## 2022-07-21 DIAGNOSIS — Z91.89 AT HIGH RISK FOR BREAST CANCER: Primary | ICD-10-CM

## 2022-07-21 DIAGNOSIS — Z80.3 FAMILY HISTORY OF BREAST CANCER: ICD-10-CM

## 2022-07-21 PROCEDURE — 99999 PR PBB SHADOW E&M-EST. PATIENT-LVL V: ICD-10-PCS | Mod: PBBFAC,,,

## 2022-07-21 PROCEDURE — 1159F MED LIST DOCD IN RCRD: CPT | Mod: CPTII,S$GLB,,

## 2022-07-21 PROCEDURE — 3078F DIAST BP <80 MM HG: CPT | Mod: CPTII,S$GLB,,

## 2022-07-21 PROCEDURE — 3078F PR MOST RECENT DIASTOLIC BLOOD PRESSURE < 80 MM HG: ICD-10-PCS | Mod: CPTII,S$GLB,,

## 2022-07-21 PROCEDURE — 1159F PR MEDICATION LIST DOCUMENTED IN MEDICAL RECORD: ICD-10-PCS | Mod: CPTII,S$GLB,,

## 2022-07-21 PROCEDURE — 99205 PR OFFICE/OUTPT VISIT, NEW, LEVL V, 60-74 MIN: ICD-10-PCS | Mod: S$GLB,,,

## 2022-07-21 PROCEDURE — 3008F BODY MASS INDEX DOCD: CPT | Mod: CPTII,S$GLB,,

## 2022-07-21 PROCEDURE — 3074F PR MOST RECENT SYSTOLIC BLOOD PRESSURE < 130 MM HG: ICD-10-PCS | Mod: CPTII,S$GLB,,

## 2022-07-21 PROCEDURE — 1160F RVW MEDS BY RX/DR IN RCRD: CPT | Mod: CPTII,S$GLB,,

## 2022-07-21 PROCEDURE — 99205 OFFICE O/P NEW HI 60 MIN: CPT | Mod: S$GLB,,,

## 2022-07-21 PROCEDURE — 3074F SYST BP LT 130 MM HG: CPT | Mod: CPTII,S$GLB,,

## 2022-07-21 PROCEDURE — 1160F PR REVIEW ALL MEDS BY PRESCRIBER/CLIN PHARMACIST DOCUMENTED: ICD-10-PCS | Mod: CPTII,S$GLB,,

## 2022-07-21 PROCEDURE — 3008F PR BODY MASS INDEX (BMI) DOCUMENTED: ICD-10-PCS | Mod: CPTII,S$GLB,,

## 2022-07-21 PROCEDURE — 99999 PR PBB SHADOW E&M-EST. PATIENT-LVL V: CPT | Mod: PBBFAC,,,

## 2022-07-21 RX ORDER — ASCORBIC ACID 500 MG
500 TABLET ORAL DAILY
COMMUNITY

## 2022-07-21 RX ORDER — MAGNESIUM 30 MG
TABLET ORAL NIGHTLY PRN
COMMUNITY

## 2022-07-21 NOTE — PROGRESS NOTES
[facility] - [facility]  Breast Surgical Oncology  New Patient Office Visit - H&P      Referring Provider:  Dr. Sia Olson     PCP: Akilah Olivera, PCP  CALEB Walsh     Chief Complaint:   Chief Complaint   Patient presents with    Genetic Evaluation     High Risk Visit. No pain/concerns as of today        Subjective:      History of Present Illness:  Leta Mccarthy  is a pleasant female patient who initially presents on  2022 at 37 y.o. years old for evaluation and assessment of risk for breast cancer based on  the Tyrer - Cuzick Breast Cancer Risk Model (> 20% indicates elevated lifetime risk). Her lifetime risk is calculated to be 29.4%. Her 5 year Karma score is 0.5%.     She currently denies any breast issues including rashes, redness, pain, swelling, nipple discharge, or new lumps/masses.    Imagin. 06/10/2022 SCR MG at [facility]- Negative.      Pathology:   none    OB / GYN History   Menarche Onset:13  Menopause: Menopause: premenopausal  Hormonal birth control (duration): 1 years  Pregnancies: 2  Age at first child birth: 30  Child births: 2  Hysterectomy/Oophorectomy: no- tubes were removed around age 34  HRT: no    Family History of Cancer (& age at diagnosis):  - Mother Vaginal Cancer  at age 60   Aunt maternal Breast at age 40 dunia   Aunt maternal Ovarian at age 60 dunia   Aunt maternal Lung cancer at age 60 dunia   Aunt maternal unknown at age unknown   Sister paternal Breast cancer  at age 45    Family History   Problem Relation Age of Onset    Hypertension Mother     Osteoarthritis Mother     Vaginal cancer Mother     Breast cancer Sister     Hypertension Brother     Hypertension Brother     Breast cancer Maternal Aunt     Lung cancer Maternal Aunt     Ovarian cancer Maternal Aunt     Cancer Maternal Aunt         Lifestyle:  Height and Weight: 5 foot 171 lbs   BMI: Body mass index is 33.47 kg/m².     Other:  # of breast biopsies (when and  pathology results): 0  MG breast density:  BIRADS D  Prior thoracic RT: none  Genetic testing: no  Ashkenazi Mormon descent: No    Other History:     Past Medical History:   Diagnosis Date    Cervical radiculopathy 2022    Migraine without aura and responsive to treatment 2022    Obesity 2022    Vitamin D deficiency 2022        Past Surgical History:   Procedure Laterality Date     SECTION       SECTION      HERNIA REPAIR      OOPHORECTOMY          Social History     Socioeconomic History    Marital status: Single   Tobacco Use    Smoking status: Never Smoker    Smokeless tobacco: Never Used   Substance and Sexual Activity    Alcohol use: Not Currently    Drug use: Never    Sexual activity: Yes        Immunization History   Administered Date(s) Administered    Influenza - Quadrivalent - PF *Preferred* (6 months and older) 2019    Tdap 2016        Medications/Allergies:       Current Outpatient Medications:     ascorbic acid, vitamin C, (VITAMIN C) 500 MG tablet, Take 500 mg by mouth once daily., Disp: , Rfl:     cholecalciferol, vitamin D3, (VITAMIN D3) 50 mcg (2,000 unit) Cap capsule, Take 2,000 Int'l Units by mouth once daily., Disp: , Rfl:     magnesium 30 mg Tab, Take by mouth nightly as needed., Disp: , Rfl:     multivitamin with folic acid 400 mcg Tab, Take 1 tablet by mouth once daily., Disp: , Rfl:     omega-3 fatty acids/fish oil (FISH OIL-OMEGA-3 FATTY ACIDS) 300-1,000 mg capsule, Take 2 g by mouth once daily., Disp: , Rfl:     Review of patient's allergies indicates:  No Known Allergies     Review of Systems:      Constitutional: denies fevers, chills, weight loss  HEENT: denies blurry/double vision, changes in hearing, odynophagia, dysphagia  Respiratory: denies cough, shortness of breath  Cardiovascular: denies palpitations, swelling of the extremities  GI: denies abdominal pain, nausea/vomiting, hematochezia, frequent stools  :  denies frequency, dysuria, flank pain, hematuria  Skin: denies new rashes  Neurological: denies muscular/sensory deficiencies, loss of coordination, headaches, memory changes  Endo: denies hair loss/thinning, nervousness, hot flashes, heat/cold intolerance, lumps in the neck area  Heme: denies easy bruising and fatigue  Psychological: denies anxious/depressive moods  Musculoskeletal: denies bony pain, muscle cramps, swollen joints       Objective/Physical Exam     Vitals:    07/21/22 1010   BP: 113/79   Pulse: 71   Resp: 18   Temp: 98.3 °F (36.8 °C)        General: The patient is awake, alert and oriented times three. The patient is well nourished and in no acute distress.  Neck: There is no evidence of palpable cervical, supraclavicular or axillary adenopathy. The neck is supple. The thyroid is not enlarged.  Musculoskeletal: The patient has a normal range of motion of her bilateral upper extremities.  Chest: Examination of the chest wall fails to reveal any obvious abnormalities. Nonlabored breathing, symmetric expansion.  Breast:  Right: Examination of right breast fails to reveal any dominant masses or areas of significant focal nodularity. The nipple is everted without evidence of discharge. There is no skin dimpling with movement of the pectoralis. There are no significant skin changes overlying the breast.   Left: Examination of the left breast fails to reveal any dominant masses or areas of significant focal nodularity. The nipple is everted without evidence of discharge. There is no skin dimpling with movement of the pectoralis. There are no significant skin changes overlying the breast.  Abdomen: The abdomen is soft, flat, nontender and nondistended.  Integumentary: no rashes or skin lesions present  Neurologic: cranial nerves intact, no signs of peripheral neurological deficit, motor/sensory function intact     Assessment and Plan     Patient Active Problem List   Diagnosis    Cervical radiculopathy     Vitamin D deficiency    Low back pain    Migraine without aura and responsive to treatment    Obesity    Night sweats       Leta was seen today for genetic evaluation.    Diagnoses and all orders for this visit:    Family history of breast cancer  -     Mammo Digital Screening Bilat; Future  -     MRI Breast w/wo Contrast, w/CAD, Bilateral; Future    At high risk for breast cancer  -     Ambulatory referral/consult to Breast Surgery  -     Mammo Digital Screening Bilat; Future  -     MRI Breast w/wo Contrast, w/CAD, Bilateral; Future       -------------------------------------------------------------------------------------------------------------  After the initial clinical evaluation nearly 30 minutes were on counseling the patients regarding the options for management. Risk reduction strategies were discussed.     1. Lifestyle factors: As with other types of cancer, studies continue to show that various lifestyle factors may contribute to the development of breast cancer.     Weight: Recent studies have shown that postmenopausal women who are overweight or obese have an increased risk of breast cancer. These women also have a higher risk of having the cancer come back after treatment.     Physical activity: Decreased physical activity is associated with an increased risk of developing breast cancer and a higher risk of having the cancer come back after treatment. Regular physical activity may protect against breast cancer by helping women maintain a healthy body weight, lowering hormone levels, or causing changes in a womens metabolism or immune factors.     Alcohol: Current research suggests that having more than 1 to 2 alcoholic drinks, including beer, wine, and spirits, per day raises the risk of breast cancer, as well as the risk of having the cancer come back after treatment.     Food: There is no reliable research that confirms that eating or avoiding specific foods reduces the risk of developing  "breast cancer or having the cancer come back after treatment. However, eating more fruits and vegetables and fewer animal fats is linked with many health benefits.     2. Prevention:  Surgery to lower cancer risk: For women with BRCA1 or BRCA2 genetic mutations, which substantially increase the risk of breast cancer, preventive removal of the breasts may be considered. The procedure, called a prophylactic mastectomy, appears to reduce the risk of developing breast cancer by at least 95%. Women with these mutations should also consider the preventive removal of the ovaries and fallopian tubes, called a prophylactic salpingo-oophorectomy. This procedure can reduce the risk of developing ovarian cancer, as well as breast cancer, by stopping the ovaries from making estrogen.      Drugs to lower cancer risk (Chemoprevention): Women who have a higher than usual risk of developing breast cancer may consider certain drugs that may help prevent breast cancer. This approach may also be called "chemoprevention." For breast cancer, this is the use of hormone-blocking drugs to reduce cancer risk. The drugs, tamoxifen (Soltamox) and raloxifene (Evista), are approved by the U.S. Food and Drug Administration (FDA) to lower breast cancer risk. These drugs are called selective estrogen receptor modulators (SERMs) and are not chemotherapy. A SERM is a medication that blocks estrogen receptors in some tissues and not others. Both women who have gone through menopause and those who have not may take tamoxifen. Raloxifene is only approved for women who have gone through menopause. Each drug also has different side effects.     Aromatase inhibitors (AIs) have also been shown to lower breast cancer risk. AIs are a type of hormone-blocking treatment that reduces the amount of estrogen in a woman's body by stopping tissues and organs other than the ovaries from producing estrogen. They can only be used by women who have gone through " menopause. However, no AIs have been approved by the FDA for lowering breast cancer risk in women who do not have the disease.     3. Surveillance: Women at greater than 20 percent average lifetime risk of invasive breast cancer based mainly on family history     Clinical Breast exam: Every 6-12 months starting at age found to be at increased risk by risk model     Mammogram: Every year starting 10 years younger than the youngest breast cancer case in the family (but not before age 30)     Breast MRI: Every year starting 10 years younger than the youngest breast cancer case in the family (but not before age 25). If you have a first-degree relative with a BRCA1/2 gene mutation, youre encouraged to get genetic counseling and/or testing before getting MRI as part of screening (for those who do not wish to have genetic testing, MRI is recommended). Breast MRI in combination with mammography is better than mammography alone at finding breast cancer in certain women at higher than average risk.    -------------------------------------------------------------------------------------------------------------  PLAN:    1. Lifestyle - Healthy lifestyle guidelines were reviewed. She was encouraged to engage in regular exercise, maintain a healthy body weight, and avoid excessive alcohol consumption. Healthy nutritional guidelines were also discussed. Self-breast examination was reviewed with the patient in detail and she was encouraged to perform this on a monthly basis.    2. Surveillance - She desires undergoing high risk screening with annual screening mammograms and breast MRIs. In the absence of significant clinical findings in the interval, I recommend a high risk screening MRI in December, SCR MG in June. RTC in January to go over genetic results and MRI results.     3. Prevention - We had a brief discussion/education about indications for preventative mastectomy or chemoprevention.  These methods are not recommended to  her at this time.    4. Genetics - According to NCCN guidelines, she meets criteria for genetic testing. Referral to genetic counseling placed.      5. Continue to see OB/GYN for CBE. Recommend two CBE a year. One with us and one with your OB/GYN Provider. We recommend them to be at a six month interval.      6. Please call our office with any questions or concerns that may arise before the next appointment.       All of her questions were answered.     PRAVEEN Patel    -------------------------------------------------------------------------------------------------------------  Total time on the date of the visit ranged from 60-74 mins (30318). Total time includes both face-to-face and non-face-to-face time personally spent by myself on the day of the visit.    Non-face-to-face time included:  _X_ preparing to see the patient such as reviewing the patient record  _X_ obtaining and reviewing separately obtained history  _X_ independently interpreting results  _X_ documenting clinical information in electronic health record.    Face-to-face time included:  _X_ performing an appropriate history and examination  _X_ communicating results to the patient  _X_ counseling and educating the patient  __ ordering appropriate medications  _x_ ordering appropriate tests  _X_ ordering appropriate procedures (including follow-up)  _X_ answering any questions the patient had    Total Time spent on date of visit: 60 minutes

## 2022-08-17 ENCOUNTER — OFFICE VISIT (OUTPATIENT)
Dept: HEMATOLOGY/ONCOLOGY | Facility: CLINIC | Age: 37
End: 2022-08-17
Payer: COMMERCIAL

## 2022-08-17 DIAGNOSIS — Z80.3 FAMILY HISTORY OF BREAST CANCER: ICD-10-CM

## 2022-08-17 DIAGNOSIS — Z80.41 FAMILY HISTORY OF OVARIAN CANCER: Primary | ICD-10-CM

## 2022-08-17 DIAGNOSIS — Z91.89 AT HIGH RISK FOR BREAST CANCER: ICD-10-CM

## 2022-08-17 PROCEDURE — 99205 PR OFFICE/OUTPT VISIT, NEW, LEVL V, 60-74 MIN: ICD-10-PCS | Mod: 95,,, | Performed by: NURSE PRACTITIONER

## 2022-08-17 PROCEDURE — 1159F MED LIST DOCD IN RCRD: CPT | Mod: CPTII,95,, | Performed by: NURSE PRACTITIONER

## 2022-08-17 PROCEDURE — 1159F PR MEDICATION LIST DOCUMENTED IN MEDICAL RECORD: ICD-10-PCS | Mod: CPTII,95,, | Performed by: NURSE PRACTITIONER

## 2022-08-17 PROCEDURE — 99205 OFFICE O/P NEW HI 60 MIN: CPT | Mod: 95,,, | Performed by: NURSE PRACTITIONER

## 2022-08-17 NOTE — PROGRESS NOTES
REFERRING PHYSICIAN: Yesenia Hillman NP    Subjective:       Patient ID: Leta Mccarthy is a 37 y.o. female.    Chief Complaint: No personal history of cancer but a family history of cancer. Patient presented via Telemedicine Visit (audio and video) today for risk assessment, genetic counseling, and consideration for genetic testing.      Past Medical History:   Diagnosis Date    Cervical radiculopathy 2022    Migraine without aura and responsive to treatment 2022    Obesity 2022    Vitamin D deficiency 2022     Past Surgical History:   Procedure Laterality Date     SECTION       SECTION      HERNIA REPAIR      OOPHORECTOMY       Review of patient's allergies indicates:  Patient has no known allergies.     Review of Systems     Oncology History    No history exists.      Family History   Problem Relation Age of Onset    Hypertension Mother     Osteoarthritis Mother     Vaginal cancer Mother 61    Breast cancer Sister 44    Hypertension Brother     Hypertension Brother     Breast cancer Maternal Aunt 45    Lung cancer Maternal Aunt     Ovarian cancer Maternal Aunt     Cancer Maternal Aunt          Assessment:   Risk Assessment:  This patient is at increased risk of having a genetic mutation that increases the risk of cancer. She meets criteria for genetic testing based on the National Comprehensive Cancer Network (NCCN) criteria due to a family history that includes ovarian cancer (maternal aunt) and breast cancer on the same side of the family (maternal aunt), in addition to breast cancer diagnosed under 45y in a first degree relative on the paternal side of the family (paternal 1/2 sister dx44y)  (see family history and pedigree). Based on her likelihood of having a mutation, BRCA1/2 Analyses with myRisk testing through [facility] was described in detail.    Education and Counseling:  [facility] is a gene panel that evaluates a broad  number of hereditary cancer syndromes to help define patients' cancer risk with a focus on eight primary cancer sites (breast, ovarian, gastric, colorectal, pancreatic, melanoma, prostate, and endometrial). This test is appropriate for patients that meet criteria for genetic testing for Breast and Ovarian Cancer Syndrome. This panel will test for genes that increase cancer risk APC, OBDULIA, AXIN2, BAP1, BARD1, BMPR1A, BRCA1, BRCA2, BRIP1, CDH1, CDK4, CDKN2A, CHEK2, CTNNA1, FH, FLCN, HOXB13 (seq only), MEN1, MET, MLH1, MSH2, MSH3 (excluding repetitive portions of exon 1), MSH6, MUTYH, NTHL1, PALB2, PMS2, PTEN, RAD51C, RAD51D, SDHA, SDHB, SDHC, SDHD, SMAD4, STK11, TP53, TSC1, TSC2, VHL.    Risks of cancer associated with inherited cancer predisposition mutations were discussed in detail.  If a mutation were found, this patient would have a significantly increased risk for cancer.  It has been shown that individuals with a BRCA1 or BRCA2 mutation face a 56-87% lifetime risk of breast cancer and a 27-44% lifetime risk of ovarian cancer. Mutation carriers who have had breast cancer have a 20% (BRCA1) or 12% (BRCA2) chance of developing a second breast cancer within 5 years, and up to 64% (BRCA1) or 52% (BRCA2) of a second breast cancer by age 70. Mutation carriers have up to a 5% risk of pancreatic cancer, as well as increased risk for other cancers such as colon cancer and lymphoma. Other inherited cancer syndromes that are also included in the myRisk test, may have different, but still significant risk for cancer.    The availability of clinical management options for inherited cancer predisposition mutation carriers was discussed, including increased surveillance, chemo prevention, and prophylactic surgery. Details of the testing process, including benefits and limitations of genetic analysis as well as the implications of possible test results, were discussed.  Because this patient is the first member of her family to  be tested comprehensive testing was presented.  Related insurance issues were discussed.      Summary:  This patient was evaluated for hereditary risk of cancer and was found to be at an increased risk of having an inherited cancer predisposition mutation.  The option of genetic testing was explained in detail, including the possible impact of this information on family members.  Since this patient wishes to proceed with testing an informed consent will be obtained and blood drawn and sent to [facility].  Results will be expected 4 weeks from this time.  A follow-up appointment will be scheduled for results disclosure.        The patient location is: home    Visit type: audiovisual    Face to Face time with patient: 35 minutes  >60 minutes of total time spent on the encounter, which includes face to face time and non-face to face time preparing to see the patient (eg, review of tests), Obtaining and/or reviewing separately obtained history, Documenting clinical information in the electronic or other health record, Independently interpreting results (not separately reported) and communicating results to the patient/family/caregiver, or Care coordination (not separately reported).         Each patient to whom he or she provides medical services by telemedicine is:  (1) informed of the relationship between the physician and patient and the respective role of any other health care provider with respect to management of the patient; and (2) notified that he or she may decline to receive medical services by telemedicine and may withdraw from such care at any time.      BRETT BEAUCHAMP, PhD    Answers for HPI/ROS submitted by the patient on 8/15/2022  appetite change : No  unexpected weight change: No  mouth sores: No  visual disturbance: No  cough: No  shortness of breath: No  chest pain: No  abdominal pain: No  diarrhea: No  frequency: No  back pain: No  rash: No  headaches: No  adenopathy: No  nervous/  anxious: No

## 2022-08-29 ENCOUNTER — LAB VISIT (OUTPATIENT)
Dept: LAB | Facility: HOSPITAL | Age: 37
End: 2022-08-29
Attending: NURSE PRACTITIONER
Payer: COMMERCIAL

## 2022-08-29 DIAGNOSIS — Z80.41 FAMILY HISTORY OF OVARIAN CANCER: ICD-10-CM

## 2022-08-29 DIAGNOSIS — Z80.3 FAMILY HISTORY OF BREAST CANCER: ICD-10-CM

## 2022-08-29 PROCEDURE — 36415 COLL VENOUS BLD VENIPUNCTURE: CPT

## 2022-09-22 ENCOUNTER — HISTORICAL (OUTPATIENT)
Dept: ADMINISTRATIVE | Facility: HOSPITAL | Age: 37
End: 2022-09-22
Payer: COMMERCIAL

## 2022-10-17 ENCOUNTER — TELEPHONE (OUTPATIENT)
Dept: HEMATOLOGY/ONCOLOGY | Facility: CLINIC | Age: 37
End: 2022-10-17
Payer: COMMERCIAL

## 2022-10-24 NOTE — PROGRESS NOTES
REFERRING PROVIDER: Yesenia Hillman NP     Subjective:       Patient ID: Leta Mccarthy is a 37 y.o. female.    Chief Complaint: No personal history of cancer but a family history of cancer. Patient presented for genetic counseling on 2022 and was found appropriate for genetic testing based on the National Comprehensive Cancer Network (NCCN) criteria due to a family history that includes ovarian cancer (maternal aunt) and breast cancer on the same side of the family (maternal aunt), in addition to breast cancer diagnosed under 45y in a first degree relative on the paternal side of the family (paternal 1/2 sister dx44y)  (see family history and pedigree). She signed consent, lab was drawn and sent to [facility] for BRCA1/2 Analyses with myRiskpanel testing. She presented via Telemedicine Visit (audio and video) today for results disclosure.     HPI  Past Medical History:   Diagnosis Date    Cervical radiculopathy 2022    Migraine without aura and responsive to treatment 2022    Obesity 2022    Vitamin D deficiency 2022      Past Surgical History:   Procedure Laterality Date     SECTION       SECTION      HERNIA REPAIR      OOPHORECTOMY        Review of patient's allergies indicates:  No Known Allergies     Review of Systems          Problem List Items Addressed This Visit    None    Oncology History    No history exists.      Family History   Problem Relation Age of Onset    Hypertension Mother     Osteoarthritis Mother     Vaginal cancer Mother 61    Breast cancer Sister 44    Hypertension Brother     Hypertension Brother     Breast cancer Maternal Aunt 45    Lung cancer Maternal Aunt     Ovarian cancer Maternal Aunt     Cancer Maternal Aunt         Assessment:   Genetic testing was appropriate for this patient because of her family history. 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 The heterozygous germline MSH3 mutation c.1625dup is predicted to result in the premature truncation of the MSH3 protein at amino acid position 553 (p.Vll485Rtajx*12). As such, this alteration is classified as pathogenic.    Pathogenic variants in this gene have been detected in individuals with increased susceptibility to adenomatous polyposis and colorectal cancer, which is inherited in an autosomal recessive fashion. MSH3-associated polyposis is characterized by the presence of colorectal adenomas and/or colorectal cancer; however, current data is insufficient to determine the penetrance associated with MSH3-related polyposis and/or if there is risk for extracolonic neoplasms (Bob R et al. Am J Hum Deisi. 2016 Aug 4;99(2):337-51). There is insufficient evidence to suggest an increased cancer risk above that of the general population for monoallelic carriers of pathogenic MSH3 variants.     First degree relatives of individuals with an MSH3 mutation have a 50% risk of having this same mutation. Parents who both have an MSH3 mutation have a 25% risk of having a child with MSH3-related polyposis. Family members can be tested with specific site analysis.    Analysis consisted of sequencing and large rearrangement analyses of the following genes: APC, OBDULIA, BARD1, BMPR1A, BRCA1, BRCA2, BRIP1, CDH1, CDK4, CDKN2A (p14ARF and x82ilg5j), CHEK2, EPCAM (large rearrangement only), GREM1, MLH1, MSH2, MSH6, MUTYH, NBN, PALB2, PMS2, POLE, POLD1, PTEN, RAD51C, RAD51D, SMAD4, STK11, TP53. In addition to the MSH3 mutation discussed above, this comprehensive [facility] genetic analysis indicated that there was no deleterious mutation found in any of these genes. However, there was a variant of uncertain significance (VUS): APC c.4072G>A (p.Nhk0934Vvr) (aka I7847V (4072G>A)). This variant is predicted to be "probably damaging" by in silico analyses (PolyPhen2 score: 1.0). However, this is a computer " analysis and there are currently insufficient data to determine if this variant causes increased cancer risk. Therefore, the contribution of this variant to the relative risk of cancer cannot be established from this analysis. If as a result of ongoing reclassification efforts, [facility] should determine that this variant becomes clinically actionable, an amended report will be sent to me as healthcare provider. I will then contact the patient for a discussion of implications of this new information and a healthcare plan will be made accordingly.    A copy of the results will be emailed to the patient: [email or web address]     The patient location is: home      Visit type: audiovisual    Face to Face time with patient: 10 minutes  20 minutes of total time spent on the encounter, which includes face to face time and non-face to face time preparing to see the patient (eg, review of tests), Obtaining and/or reviewing separately obtained history, Documenting clinical information in the electronic or other health record, Independently interpreting results (not separately reported) and communicating results to the patient/family/caregiver, or Care coordination (not separately reported).         Each patient to whom he or she provides medical services by telemedicine is:  (1) informed of the relationship between the physician and patient and the respective role of any other health care provider with respect to management of the patient; and (2) notified that he or she may decline to receive medical services by telemedicine and may withdraw from such care at any time.    BRETT BEAUCHAMP, PhD

## 2022-10-26 ENCOUNTER — OFFICE VISIT (OUTPATIENT)
Dept: HEMATOLOGY/ONCOLOGY | Facility: CLINIC | Age: 37
End: 2022-10-26
Payer: COMMERCIAL

## 2022-10-26 DIAGNOSIS — Z80.3 FAMILY HISTORY OF BREAST CANCER: Primary | ICD-10-CM

## 2022-10-26 DIAGNOSIS — Z80.41 FAMILY HISTORY OF OVARIAN CANCER: ICD-10-CM

## 2022-10-26 PROCEDURE — 99213 PR OFFICE/OUTPT VISIT, EST, LEVL III, 20-29 MIN: ICD-10-PCS | Mod: 95,,, | Performed by: NURSE PRACTITIONER

## 2022-10-26 PROCEDURE — 99213 OFFICE O/P EST LOW 20 MIN: CPT | Mod: 95,,, | Performed by: NURSE PRACTITIONER

## 2023-01-11 NOTE — PROGRESS NOTES
[facility] Breast Surg  Breast Surgical Oncology  New Patient Office Visit - H&P    PCP: Akilah Olivera, PCP  CALEB Walsh     Chief Complaint:   Chief Complaint   Patient presents with    Follow-up     Patient present today for a follow up visit. Had MG done in 2022. Genetic results 10/2022. Patient c/o tenderness in both breasts relating to her menstrual cycle, but no pain.        Subjective:      Treatments:  10/2022 Genetic testing-  heterozygous deleterious mutation found in MSH3 c.1625dup (p.Iie420Jmbty*12). There was a  variant of uncertain significance (VUS) detected as well the  APC c.4072G>A (p.Hmn1064Aor) (aka T4121M (4072G>A)).     Interval History:  She is doing well in the interval. She currently denies any breast issues including rashes, redness, pain, swelling, nipple discharge, or new lumps/masses.  She had genetic testing done that resulted with MSH3 gene and a VUS of the APC gene. I updated her risk calculation today and it remains the same at 29.4%. She was going to get a Breast MRI done in the interval but was unable to schedule it due to her cycle. She is going to call back and see if she can get it booked soon. She had a SCR MG in 2022 that resulted as benign.     History of Present Illness:  Leta Mccarthy  is a pleasant female patient who initially presents on  2023 at 37 y.o. years old for evaluation and assessment of risk for breast cancer based on  the Tyrer - Cuzick Breast Cancer Risk Model (> 20% indicates elevated lifetime risk). Her lifetime risk is calculated to be 29.4%. Her 5 year Karma score is 0.5%.     She currently denies any breast issues including rashes, redness, pain, swelling, nipple discharge, or new lumps/masses.    We had a brief discussion/education about indications for preventative mastectomy or chemoprevention.  These methods are not recommended to her at this time.      Imagin/10/2022 SCR MG at  [facility]- Negative.      Pathology:   none    OB / GYN History   Menarche Onset:13  Menopause: Menopause: premenopausal  Hormonal birth control (duration): 1 years  Pregnancies: 2  Age at first child birth: 30  Child births: 2  Hysterectomy/Oophorectomy: no- tubes were removed around age 34  HRT: no    Family History of Cancer (& age at diagnosis):  - Mother Vaginal Cancer  at age 60   Aunt maternal Breast at age 40 dunia   Aunt maternal Ovarian at age 60 dunia   Aunt maternal Lung cancer at age 60 dunia   Aunt maternal unknown at age unknown   Sister paternal Breast cancer  at age 45    Family History   Problem Relation Age of Onset    Hypertension Mother     Osteoarthritis Mother     Vaginal cancer Mother 61    Arthritis Mother     Cancer Mother     Breast cancer Sister 44    Hypertension Brother     Heart disease Brother     Hypertension Brother     Breast cancer Maternal Aunt 45    Lung cancer Maternal Aunt     Ovarian cancer Maternal Aunt     Cancer Maternal Aunt         Lifestyle:  Height and Weight: 5 foot 171 lbs   BMI: Body mass index is 34.57 kg/m².     Other:  # of breast biopsies (when and pathology results): 0  MG breast density:  BIRADS D  Prior thoracic RT: none  Genetic testing: no  Ashkenazi Mandaen descent: No    Other History:     Past Medical History:   Diagnosis Date    Cervical radiculopathy 2022    Migraine without aura and responsive to treatment 2022    Obesity 2022    Vitamin D deficiency 2022        Past Surgical History:   Procedure Laterality Date     SECTION       SECTION      HERNIA REPAIR      OOPHORECTOMY      TUBAL LIGATION          Social History     Socioeconomic History    Marital status: Single   Tobacco Use    Smoking status: Never    Smokeless tobacco: Never   Substance and Sexual Activity    Alcohol use: Not Currently    Drug use: Never    Sexual activity: Yes     Partners: Male     Birth control/protection: None        Immunization History    Administered Date(s) Administered    Influenza - Quadrivalent - PF *Preferred* (6 months and older) 09/04/2019    Tdap 07/06/2016        Medications/Allergies:       Current Outpatient Medications:     ascorbic acid, vitamin C, (VITAMIN C) 500 MG tablet, Take 500 mg by mouth once daily., Disp: , Rfl:     cholecalciferol, vitamin D3, (VITAMIN D3) 50 mcg (2,000 unit) Cap capsule, Take 2,000 Int'l Units by mouth once daily., Disp: , Rfl:     magnesium 30 mg Tab, Take by mouth nightly as needed., Disp: , Rfl:     multivitamin with folic acid 400 mcg Tab, Take 1 tablet by mouth once daily., Disp: , Rfl:     omega-3 fatty acids/fish oil (FISH OIL-OMEGA-3 FATTY ACIDS) 300-1,000 mg capsule, Take 2 g by mouth once daily., Disp: , Rfl:     Review of patient's allergies indicates:  No Known Allergies     Review of Systems:      Normal unless otherwise noted in HPI        Objective/Physical Exam     Vitals:    01/20/23 0828   BP: 114/76   Pulse: 77   Resp: 18   Temp: 98.2 °F (36.8 °C)          General: The patient is awake, alert and oriented times three. The patient is well nourished and in no acute distress.  Neck: There is no evidence of palpable cervical, supraclavicular or axillary adenopathy. The neck is supple. The thyroid is not enlarged.  Musculoskeletal: The patient has a normal range of motion of her bilateral upper extremities.  Chest: Examination of the chest wall fails to reveal any obvious abnormalities. Nonlabored breathing, symmetric expansion.  Breast:  Right: Examination of right breast fails to reveal any dominant masses or areas of significant focal nodularity. The nipple is everted without evidence of discharge. There is no skin dimpling with movement of the pectoralis. There are no significant skin changes overlying the breast.   Left: Examination of the left breast fails to reveal any dominant masses or areas of significant focal nodularity. The nipple is everted without evidence of discharge. There is  no skin dimpling with movement of the pectoralis. There are no significant skin changes overlying the breast.  Abdomen: The abdomen is soft, flat, nontender and nondistended.  Integumentary: no rashes or skin lesions present  Neurologic: cranial nerves intact, no signs of peripheral neurological deficit, motor/sensory function intact     Assessment and Plan     Patient Active Problem List   Diagnosis    Cervical radiculopathy    Vitamin D deficiency    Low back pain    Migraine without aura and responsive to treatment    Obesity    Night sweats       Leta was seen today for follow-up.    Diagnoses and all orders for this visit:    Monoallelic mutation of MSH3 gene    At high risk for breast cancer  -     Mammo Digital Screening Bilat w/ [facility]o; Future    Family history of breast cancer  -     Mammo Digital Screening Bilat w/ [facility]o; Future         -------------------------------------------------------------------------------------------------------------  PLAN:    1. Lifestyle - Healthy lifestyle guidelines were reviewed. She was encouraged to engage in regular exercise, maintain a healthy body weight, and avoid excessive alcohol consumption. Healthy nutritional guidelines were also discussed. Self-breast examination was reviewed with the patient in detail and she was encouraged to perform this on a monthly basis.    2. MRI at next available. Order is in from previous visit. (Patient may hold off and wait until the end of the year if the cost is too high on MRI).     3.  SCR MG in June 2023    4. RTC in July to see if the patient was able to do her MRI as well as for a CBE.     5. Continue to see OB/GYN for CBE. Recommend two CBE a year. One with us and one with your OB/GYN Provider. We recommend them to be at a six month interval.      6. Please call our office with any questions or concerns that may arise before the next appointment.     7. Referral to [facility] for colon cancer screening. Patient has a MSH3  gene.       All of her questions were answered.     PRAVEEN Patel

## 2023-01-20 ENCOUNTER — OFFICE VISIT (OUTPATIENT)
Dept: SURGERY | Facility: CLINIC | Age: 38
End: 2023-01-20
Payer: COMMERCIAL

## 2023-01-20 VITALS
SYSTOLIC BLOOD PRESSURE: 114 MMHG | DIASTOLIC BLOOD PRESSURE: 76 MMHG | TEMPERATURE: 98 F | BODY MASS INDEX: 34.75 KG/M2 | HEART RATE: 77 BPM | RESPIRATION RATE: 18 BRPM | OXYGEN SATURATION: 100 % | WEIGHT: 177 LBS | HEIGHT: 60 IN

## 2023-01-20 DIAGNOSIS — Z12.11 SCREENING FOR COLON CANCER: ICD-10-CM

## 2023-01-20 DIAGNOSIS — Z80.3 FAMILY HISTORY OF BREAST CANCER: ICD-10-CM

## 2023-01-20 DIAGNOSIS — Z15.04 MONOALLELIC MUTATION OF MSH3 GENE: Primary | ICD-10-CM

## 2023-01-20 DIAGNOSIS — Z91.89 AT HIGH RISK FOR BREAST CANCER: ICD-10-CM

## 2023-01-20 PROCEDURE — 99999 PR PBB SHADOW E&M-EST. PATIENT-LVL IV: CPT | Mod: PBBFAC,,,

## 2023-01-20 PROCEDURE — 3074F SYST BP LT 130 MM HG: CPT | Mod: CPTII,S$GLB,,

## 2023-01-20 PROCEDURE — 99213 OFFICE O/P EST LOW 20 MIN: CPT | Mod: S$GLB,,,

## 2023-01-20 PROCEDURE — 3008F PR BODY MASS INDEX (BMI) DOCUMENTED: ICD-10-PCS | Mod: CPTII,S$GLB,,

## 2023-01-20 PROCEDURE — 3074F PR MOST RECENT SYSTOLIC BLOOD PRESSURE < 130 MM HG: ICD-10-PCS | Mod: CPTII,S$GLB,,

## 2023-01-20 PROCEDURE — 3078F DIAST BP <80 MM HG: CPT | Mod: CPTII,S$GLB,,

## 2023-01-20 PROCEDURE — 3008F BODY MASS INDEX DOCD: CPT | Mod: CPTII,S$GLB,,

## 2023-01-20 PROCEDURE — 1159F PR MEDICATION LIST DOCUMENTED IN MEDICAL RECORD: ICD-10-PCS | Mod: CPTII,S$GLB,,

## 2023-01-20 PROCEDURE — 99213 PR OFFICE/OUTPT VISIT, EST, LEVL III, 20-29 MIN: ICD-10-PCS | Mod: S$GLB,,,

## 2023-01-20 PROCEDURE — 1159F MED LIST DOCD IN RCRD: CPT | Mod: CPTII,S$GLB,,

## 2023-01-20 PROCEDURE — 99999 PR PBB SHADOW E&M-EST. PATIENT-LVL IV: ICD-10-PCS | Mod: PBBFAC,,,

## 2023-01-20 PROCEDURE — 3078F PR MOST RECENT DIASTOLIC BLOOD PRESSURE < 80 MM HG: ICD-10-PCS | Mod: CPTII,S$GLB,,

## 2023-07-19 ENCOUNTER — HOSPITAL ENCOUNTER (OUTPATIENT)
Dept: RADIOLOGY | Facility: HOSPITAL | Age: 38
Discharge: HOME | End: 2023-07-19
Payer: COMMERCIAL

## 2023-07-19 DIAGNOSIS — Z80.3 FAMILY HISTORY OF BREAST CANCER: ICD-10-CM

## 2023-07-19 DIAGNOSIS — Z91.89 AT HIGH RISK FOR BREAST CANCER: ICD-10-CM

## 2023-07-19 PROCEDURE — 77067 SCR MAMMO BI INCL CAD: CPT | Mod: 26,,, | Performed by: RADIOLOGY

## 2023-07-19 PROCEDURE — 77063 MAMMO DIGITAL SCREENING BILAT WITH TOMO: ICD-10-PCS | Mod: 26,,, | Performed by: RADIOLOGY

## 2023-07-19 PROCEDURE — 77067 MAMMO DIGITAL SCREENING BILAT WITH TOMO: ICD-10-PCS | Mod: 26,,, | Performed by: RADIOLOGY

## 2023-07-19 PROCEDURE — 77063 BREAST TOMOSYNTHESIS BI: CPT | Mod: 26,,, | Performed by: RADIOLOGY

## 2023-07-19 PROCEDURE — 77067 SCR MAMMO BI INCL CAD: CPT | Mod: TC

## 2023-08-10 ENCOUNTER — OFFICE VISIT (OUTPATIENT)
Dept: SURGERY | Facility: CLINIC | Age: 38
End: 2023-08-10
Payer: COMMERCIAL

## 2023-08-10 VITALS
TEMPERATURE: 98 F | HEART RATE: 74 BPM | BODY MASS INDEX: 36.98 KG/M2 | HEIGHT: 60 IN | DIASTOLIC BLOOD PRESSURE: 81 MMHG | RESPIRATION RATE: 18 BRPM | OXYGEN SATURATION: 100 % | SYSTOLIC BLOOD PRESSURE: 134 MMHG | WEIGHT: 188.38 LBS

## 2023-08-10 DIAGNOSIS — Z91.89 AT HIGH RISK FOR BREAST CANCER: Primary | ICD-10-CM

## 2023-08-10 DIAGNOSIS — Z12.39 BREAST CANCER SCREENING, HIGH RISK PATIENT: ICD-10-CM

## 2023-08-10 DIAGNOSIS — Z80.3 FAMILY HISTORY OF BREAST CANCER: ICD-10-CM

## 2023-08-10 PROCEDURE — 3075F PR MOST RECENT SYSTOLIC BLOOD PRESS GE 130-139MM HG: ICD-10-PCS | Mod: CPTII,S$GLB,, | Performed by: STUDENT IN AN ORGANIZED HEALTH CARE EDUCATION/TRAINING PROGRAM

## 2023-08-10 PROCEDURE — 1159F PR MEDICATION LIST DOCUMENTED IN MEDICAL RECORD: ICD-10-PCS | Mod: CPTII,S$GLB,, | Performed by: STUDENT IN AN ORGANIZED HEALTH CARE EDUCATION/TRAINING PROGRAM

## 2023-08-10 PROCEDURE — 3008F PR BODY MASS INDEX (BMI) DOCUMENTED: ICD-10-PCS | Mod: CPTII,S$GLB,, | Performed by: STUDENT IN AN ORGANIZED HEALTH CARE EDUCATION/TRAINING PROGRAM

## 2023-08-10 PROCEDURE — 99999 PR PBB SHADOW E&M-EST. PATIENT-LVL IV: ICD-10-PCS | Mod: PBBFAC,,, | Performed by: STUDENT IN AN ORGANIZED HEALTH CARE EDUCATION/TRAINING PROGRAM

## 2023-08-10 PROCEDURE — 1160F RVW MEDS BY RX/DR IN RCRD: CPT | Mod: CPTII,S$GLB,, | Performed by: STUDENT IN AN ORGANIZED HEALTH CARE EDUCATION/TRAINING PROGRAM

## 2023-08-10 PROCEDURE — 99214 OFFICE O/P EST MOD 30 MIN: CPT | Mod: S$GLB,,, | Performed by: STUDENT IN AN ORGANIZED HEALTH CARE EDUCATION/TRAINING PROGRAM

## 2023-08-10 PROCEDURE — 3075F SYST BP GE 130 - 139MM HG: CPT | Mod: CPTII,S$GLB,, | Performed by: STUDENT IN AN ORGANIZED HEALTH CARE EDUCATION/TRAINING PROGRAM

## 2023-08-10 PROCEDURE — 3008F BODY MASS INDEX DOCD: CPT | Mod: CPTII,S$GLB,, | Performed by: STUDENT IN AN ORGANIZED HEALTH CARE EDUCATION/TRAINING PROGRAM

## 2023-08-10 PROCEDURE — 99214 PR OFFICE/OUTPT VISIT, EST, LEVL IV, 30-39 MIN: ICD-10-PCS | Mod: S$GLB,,, | Performed by: STUDENT IN AN ORGANIZED HEALTH CARE EDUCATION/TRAINING PROGRAM

## 2023-08-10 PROCEDURE — 1159F MED LIST DOCD IN RCRD: CPT | Mod: CPTII,S$GLB,, | Performed by: STUDENT IN AN ORGANIZED HEALTH CARE EDUCATION/TRAINING PROGRAM

## 2023-08-10 PROCEDURE — 3079F DIAST BP 80-89 MM HG: CPT | Mod: CPTII,S$GLB,, | Performed by: STUDENT IN AN ORGANIZED HEALTH CARE EDUCATION/TRAINING PROGRAM

## 2023-08-10 PROCEDURE — 1160F PR REVIEW ALL MEDS BY PRESCRIBER/CLIN PHARMACIST DOCUMENTED: ICD-10-PCS | Mod: CPTII,S$GLB,, | Performed by: STUDENT IN AN ORGANIZED HEALTH CARE EDUCATION/TRAINING PROGRAM

## 2023-08-10 PROCEDURE — 3079F PR MOST RECENT DIASTOLIC BLOOD PRESSURE 80-89 MM HG: ICD-10-PCS | Mod: CPTII,S$GLB,, | Performed by: STUDENT IN AN ORGANIZED HEALTH CARE EDUCATION/TRAINING PROGRAM

## 2023-08-10 PROCEDURE — 99999 PR PBB SHADOW E&M-EST. PATIENT-LVL IV: CPT | Mod: PBBFAC,,, | Performed by: STUDENT IN AN ORGANIZED HEALTH CARE EDUCATION/TRAINING PROGRAM

## 2023-08-10 RX ORDER — SODIUM, POTASSIUM,MAG SULFATES 17.5-3.13G
1 SOLUTION, RECONSTITUTED, ORAL ORAL
COMMUNITY
Start: 2023-05-05 | End: 2024-02-01

## 2023-08-10 NOTE — PROGRESS NOTES
[facility] [place] Breast Surg  Breast Surgical Oncology  FU Patient Office Visit - H&P    PCP: Akilah Olivera, PCP  CALEB Walsh     Chief Complaint:   Chief Complaint   Patient presents with    Follow-up     6 Month CBE/High Risk Followup - no breast pain, no swelling, no nipple discharge        Subjective:      Treatments:  10/2022 Genetic testing-  heterozygous deleterious mutation found in MSH3 c.1625dup (p.Nuw083Wvknm*12).  VUS APC c.4072G>A (p.Boa8667Itq) (aka A5105W (4072G>A)).     Interval History:  Leta Mccarthy is a 38 y.o.female who is here today for high risk FU.  She is doing well in the interval.  She occasionally gets sharp shooting pains in BL breasts around the time of her period that do not last long.  She denies any other breast issues including rashes, redness, swelling, nipple discharge, or new lumps/masses. She had recent screening mammogram in July which did not show any suspicious findings.    She saw Dr. Sommers and has a colonoscopy scheduled for next week.    She has a 3 and 8 year old boy and is not planning to have any more children.    History of Present Illness:  Leta Mccarthy  is a pleasant female patient who initially presents on  08/10/2023 at 38 y.o. years old for evaluation and assessment of risk for breast cancer based on  the Tyrer - Cuzick Breast Cancer Risk Model (> 20% indicates elevated lifetime risk). Her lifetime risk is calculated to be 29.4%. Her 5 year Karma score is 0.5%.     Imagin/10/2022 SCR MG at [facility]- Negative.    Bilateral screening mammogram 2023:  Density D.  No suspicious findings.  BI-RADS 1.    I have reviewed the imaging and agree with the radiologists interpretation. I have discussed these results with the patient.      Pathology:   none    OB / GYN History   Menarche Onset:13  Menopause: Menopause: premenopausal  Hormonal birth control (duration): 1 years  Pregnancies: 2  Age at first child birth:  30  Child births: 2  Hysterectomy/Oophorectomy: no- tubes were removed around age 34  HRT: no    Family History of Cancer (& age at diagnosis):  - Mother Vaginal Cancer  at age 60   Aunt maternal Breast at age 40 dunia   Aunt maternal Ovarian at age 60 dunia   Aunt maternal Lung cancer at age 60 dunia   Aunt maternal unknown at age unknown   Sister paternal Breast cancer  at age 45    Family History   Problem Relation Age of Onset    Hypertension Mother     Osteoarthritis Mother     Vaginal cancer Mother 61    Arthritis Mother     Cancer Mother     Breast cancer Sister 44    Hypertension Brother     Heart disease Brother     Hypertension Brother     Breast cancer Maternal Aunt 45    Lung cancer Maternal Aunt     Ovarian cancer Maternal Aunt     Cancer Maternal Aunt         Lifestyle:  Height and Weight: 5 foot 171 lbs   BMI: Body mass index is 36.79 kg/m².     Other:  # of breast biopsies (when and pathology results): 0  MG breast density:  BIRADS D  Prior thoracic RT: none  Genetic testing: no  Ashkenazi Yarsanism descent: No    Other History:     Past Medical History:   Diagnosis Date    Cervical radiculopathy 2022    Migraine without aura and responsive to treatment 2022    Obesity 2022    Vitamin D deficiency 2022        Past Surgical History:   Procedure Laterality Date     SECTION       SECTION      HERNIA REPAIR      OOPHORECTOMY      TUBAL LIGATION          Social History     Socioeconomic History    Marital status: Single   Tobacco Use    Smoking status: Never    Smokeless tobacco: Never   Substance and Sexual Activity    Alcohol use: Not Currently    Drug use: Never    Sexual activity: Yes     Partners: Male     Birth control/protection: None        Immunization History   Administered Date(s) Administered    Influenza - Quadrivalent - PF *Preferred* (6 months and older) 2019    Tdap 2016        Medications/Allergies:       Current Outpatient Medications:      ascorbic acid, vitamin C, (VITAMIN C) 500 MG tablet, Take 500 mg by mouth once daily., Disp: , Rfl:     cholecalciferol, vitamin D3, (VITAMIN D3) 50 mcg (2,000 unit) Cap capsule, Take 2,000 Int'l Units by mouth once daily., Disp: , Rfl:     magnesium 30 mg Tab, Take by mouth nightly as needed., Disp: , Rfl:     multivitamin with folic acid 400 mcg Tab, Take 1 tablet by mouth once daily., Disp: , Rfl:     omega-3 fatty acids/fish oil (FISH OIL-OMEGA-3 FATTY ACIDS) 300-1,000 mg capsule, Take 2 g by mouth once daily., Disp: , Rfl:     SUPREP BOWEL PREP KIT 17.5-3.13-1.6 gram SolR, Take 1 kit by mouth., Disp: , Rfl:     Review of patient's allergies indicates:  No Known Allergies     Review of Systems:      Normal unless otherwise noted in HPI        Objective/Physical Exam     Vitals:    08/10/23 1122   BP: 134/81   Pulse: 74   Resp: 18   Temp: 97.9 °F (36.6 °C)         General: The patient is awake, alert and oriented times three. The patient is well nourished and in no acute distress.  Neck: There is no evidence of palpable cervical, supraclavicular or axillary adenopathy. The neck is supple. The thyroid is not enlarged.  Musculoskeletal: The patient has a normal range of motion of her bilateral upper extremities.  Chest: Examination of the chest wall fails to reveal any obvious abnormalities. Nonlabored breathing, symmetric expansion.  Breast Exam: The patient's breasts are symmetric.    Right: On inspection, there is no skin dimpling, rashes, retraction, erythema or skin abnormalities. The nipple areolar complex is normal. The breast moves normally with movement of the pectoralis muscle.  On palpation there are no dominant masses. There is no warmth, tenderness or fluctuance in the breast. There is no nipple discharge.  Left: On inspection, there is no skin dimpling, rashes, retraction, erythema or skin abnormalities. The nipple areolar complex is normal. The breast moves normally with movement of the pectoralis  muscle.  On palpation there are no dominant masses. There is no warmth, tenderness or fluctuance in the breast. There is no nipple discharge.       Assessment and Plan     Patient Active Problem List   Diagnosis    Cervical radiculopathy    Vitamin D deficiency    Low back pain    Migraine without aura and responsive to treatment    Obesity    Night sweats       Leta was seen today for follow-up.    Diagnoses and all orders for this visit:    At high risk for breast cancer  -     MRI Breast w/wo Contrast, w/CAD, Bilateral; Future    Family history of breast cancer  -     MRI Breast w/wo Contrast, w/CAD, Bilateral; Future    Breast cancer screening, high risk patient  -     MRI Breast w/wo Contrast, w/CAD, Bilateral; Future      Leta Mccarthy is a 38 y.o.female who is here today for high risk follow up.  There are no concerning findings on her breast exam or on recent imaging.  Today Leta Mccarthy was plugged into the [employer] risk calculator and her calculated risk of breast cancer based on Tyrer - Cuzick Breast Cancer Risk Model was 29%.  She understands that >20% is considered high risk.  Today we again discussed risk factors associated with the development of breast cancer and risk reduction strategies.       PLAN:     1. Lifestyle - Healthy lifestyle guidelines were reviewed. She was encouraged to engage in regular exercise, maintain a healthy body weight, and avoid excessive alcohol consumption. Healthy nutritional guidelines were also discussed.      2. Imaging:  She would like to continue with high-risk screening imaging.  MRI will be due in about 6 months.  We will get her scheduled for this.  Next mammogram is due 07/19/2024.    3. Recommend 2 clinical breast exams a year.  She sees her OB in September so will plan for follow-up in 6 months.     The patient was encouraged to continue her self breast exam. If she notes any changes or has any concerns with her breast in the interim she  was encouraged to call the breast center to schedule an appointment as soon as possible. All of her questions were answered.     Katt Acuña MD  Breast surgical oncology      I spent a total of 30 minutes on the day of the visit.  This includes face to face time and non-face to face time preparing to see the patient (eg, review of tests), obtaining and/or reviewing separately obtained history, documenting clinical information in the electronic or other health record, independently interpreting results and communicating results to the patient/family/caregiver, or care coordinator.

## 2023-08-21 ENCOUNTER — PATIENT MESSAGE (OUTPATIENT)
Dept: RESEARCH | Facility: HOSPITAL | Age: 38
End: 2023-08-21
Payer: COMMERCIAL

## 2023-08-31 ENCOUNTER — OFFICE VISIT (OUTPATIENT)
Dept: FAMILY MEDICINE | Facility: CLINIC | Age: 38
End: 2023-08-31
Payer: COMMERCIAL

## 2023-08-31 VITALS
BODY MASS INDEX: 37.21 KG/M2 | WEIGHT: 189.5 LBS | OXYGEN SATURATION: 99 % | DIASTOLIC BLOOD PRESSURE: 81 MMHG | SYSTOLIC BLOOD PRESSURE: 114 MMHG | HEIGHT: 60 IN | HEART RATE: 82 BPM

## 2023-08-31 DIAGNOSIS — M25.511 CHRONIC RIGHT SHOULDER PAIN: ICD-10-CM

## 2023-08-31 DIAGNOSIS — M54.12 CERVICAL RADICULOPATHY: ICD-10-CM

## 2023-08-31 DIAGNOSIS — G89.29 CHRONIC RIGHT SHOULDER PAIN: ICD-10-CM

## 2023-08-31 DIAGNOSIS — E66.09 CLASS 2 OBESITY DUE TO EXCESS CALORIES WITHOUT SERIOUS COMORBIDITY WITH BODY MASS INDEX (BMI) OF 37.0 TO 37.9 IN ADULT: ICD-10-CM

## 2023-08-31 DIAGNOSIS — Z00.00 WELLNESS EXAMINATION: Primary | ICD-10-CM

## 2023-08-31 DIAGNOSIS — M54.9 DORSALGIA, UNSPECIFIED: ICD-10-CM

## 2023-08-31 DIAGNOSIS — M54.16 RIGHT LUMBAR RADICULOPATHY: ICD-10-CM

## 2023-08-31 DIAGNOSIS — E55.9 VITAMIN D DEFICIENCY: ICD-10-CM

## 2023-08-31 LAB
ALBUMIN SERPL-MCNC: 3.9 G/DL (ref 3.5–5)
ALBUMIN/GLOB SERPL: 1.3 RATIO (ref 1.1–2)
ALP SERPL-CCNC: 73 UNIT/L (ref 40–150)
ALT SERPL-CCNC: 16 UNIT/L (ref 0–55)
AST SERPL-CCNC: 26 UNIT/L (ref 5–34)
BASOPHILS # BLD AUTO: 0.04 X10(3)/MCL
BASOPHILS NFR BLD AUTO: 0.7 %
BILIRUB SERPL-MCNC: 0.3 MG/DL
BUN SERPL-MCNC: 10.9 MG/DL (ref 7–18.7)
CALCIUM SERPL-MCNC: 8.8 MG/DL (ref 8.4–10.2)
CHLORIDE SERPL-SCNC: 105 MMOL/L (ref 98–107)
CHOLEST SERPL-MCNC: 170 MG/DL
CHOLEST/HDLC SERPL: 3 {RATIO} (ref 0–5)
CK SERPL-CCNC: 212 U/L (ref 29–168)
CO2 SERPL-SCNC: 24 MMOL/L (ref 22–29)
CREAT SERPL-MCNC: 0.75 MG/DL (ref 0.55–1.02)
CRP SERPL-MCNC: 1.7 MG/L
DEPRECATED CALCIDIOL+CALCIFEROL SERPL-MC: 48.8 NG/ML (ref 30–80)
EOSINOPHIL # BLD AUTO: 0.06 X10(3)/MCL (ref 0–0.9)
EOSINOPHIL NFR BLD AUTO: 1 %
ERYTHROCYTE [DISTWIDTH] IN BLOOD BY AUTOMATED COUNT: 13.4 % (ref 11.5–17)
EST. AVERAGE GLUCOSE BLD GHB EST-MCNC: 105.4 MG/DL
GFR SERPLBLD CREATININE-BSD FMLA CKD-EPI: >60 MLS/MIN/1.73/M2
GLOBULIN SER-MCNC: 3.1 GM/DL (ref 2.4–3.5)
GLUCOSE SERPL-MCNC: 93 MG/DL (ref 74–100)
HBA1C MFR BLD: 5.3 %
HCT VFR BLD AUTO: 39.2 % (ref 37–47)
HDLC SERPL-MCNC: 51 MG/DL (ref 35–60)
HGB BLD-MCNC: 12.6 G/DL (ref 12–16)
IMM GRANULOCYTES # BLD AUTO: 0.01 X10(3)/MCL (ref 0–0.04)
IMM GRANULOCYTES NFR BLD AUTO: 0.2 %
LDLC SERPL CALC-MCNC: 97 MG/DL (ref 50–140)
LYMPHOCYTES # BLD AUTO: 1.57 X10(3)/MCL (ref 0.6–4.6)
LYMPHOCYTES NFR BLD AUTO: 25.7 %
MCH RBC QN AUTO: 26.2 PG (ref 27–31)
MCHC RBC AUTO-ENTMCNC: 32.1 G/DL (ref 33–36)
MCV RBC AUTO: 81.5 FL (ref 80–94)
MONOCYTES # BLD AUTO: 0.52 X10(3)/MCL (ref 0.1–1.3)
MONOCYTES NFR BLD AUTO: 8.5 %
NEUTROPHILS # BLD AUTO: 3.92 X10(3)/MCL (ref 2.1–9.2)
NEUTROPHILS NFR BLD AUTO: 63.9 %
NRBC BLD AUTO-RTO: 0 %
PLATELET # BLD AUTO: 300 X10(3)/MCL (ref 130–400)
PMV BLD AUTO: 12.7 FL (ref 7.4–10.4)
POTASSIUM SERPL-SCNC: 4.2 MMOL/L (ref 3.5–5.1)
PROT SERPL-MCNC: 7 GM/DL (ref 6.4–8.3)
RBC # BLD AUTO: 4.81 X10(6)/MCL (ref 4.2–5.4)
SODIUM SERPL-SCNC: 136 MMOL/L (ref 136–145)
TRIGL SERPL-MCNC: 110 MG/DL (ref 37–140)
TSH SERPL-ACNC: 1.2 UIU/ML (ref 0.35–4.94)
URATE SERPL-MCNC: 4.7 MG/DL (ref 2.6–6)
VLDLC SERPL CALC-MCNC: 22 MG/DL
WBC # SPEC AUTO: 6.12 X10(3)/MCL (ref 4.5–11.5)

## 2023-08-31 PROCEDURE — 3079F DIAST BP 80-89 MM HG: CPT | Mod: CPTII,,, | Performed by: FAMILY MEDICINE

## 2023-08-31 PROCEDURE — 83036 HEMOGLOBIN GLYCOSYLATED A1C: CPT | Performed by: FAMILY MEDICINE

## 2023-08-31 PROCEDURE — 80061 LIPID PANEL: CPT | Performed by: FAMILY MEDICINE

## 2023-08-31 PROCEDURE — 86225 DNA ANTIBODY NATIVE: CPT | Performed by: FAMILY MEDICINE

## 2023-08-31 PROCEDURE — 85025 COMPLETE CBC W/AUTO DIFF WBC: CPT | Performed by: FAMILY MEDICINE

## 2023-08-31 PROCEDURE — 1160F PR REVIEW ALL MEDS BY PRESCRIBER/CLIN PHARMACIST DOCUMENTED: ICD-10-PCS | Mod: CPTII,,, | Performed by: FAMILY MEDICINE

## 2023-08-31 PROCEDURE — 1160F RVW MEDS BY RX/DR IN RCRD: CPT | Mod: CPTII,,, | Performed by: FAMILY MEDICINE

## 2023-08-31 PROCEDURE — 3074F SYST BP LT 130 MM HG: CPT | Mod: CPTII,,, | Performed by: FAMILY MEDICINE

## 2023-08-31 PROCEDURE — 3079F PR MOST RECENT DIASTOLIC BLOOD PRESSURE 80-89 MM HG: ICD-10-PCS | Mod: CPTII,,, | Performed by: FAMILY MEDICINE

## 2023-08-31 PROCEDURE — 3074F PR MOST RECENT SYSTOLIC BLOOD PRESSURE < 130 MM HG: ICD-10-PCS | Mod: CPTII,,, | Performed by: FAMILY MEDICINE

## 2023-08-31 PROCEDURE — 3008F PR BODY MASS INDEX (BMI) DOCUMENTED: ICD-10-PCS | Mod: CPTII,,, | Performed by: FAMILY MEDICINE

## 2023-08-31 PROCEDURE — 84443 ASSAY THYROID STIM HORMONE: CPT | Performed by: FAMILY MEDICINE

## 2023-08-31 PROCEDURE — 99395 PR PREVENTIVE VISIT,EST,18-39: ICD-10-PCS | Mod: ,,, | Performed by: FAMILY MEDICINE

## 2023-08-31 PROCEDURE — 99213 PR OFFICE/OUTPT VISIT, EST, LEVL III, 20-29 MIN: ICD-10-PCS | Mod: 25,,, | Performed by: FAMILY MEDICINE

## 2023-08-31 PROCEDURE — 3008F BODY MASS INDEX DOCD: CPT | Mod: CPTII,,, | Performed by: FAMILY MEDICINE

## 2023-08-31 PROCEDURE — 99395 PREV VISIT EST AGE 18-39: CPT | Mod: ,,, | Performed by: FAMILY MEDICINE

## 2023-08-31 PROCEDURE — 84550 ASSAY OF BLOOD/URIC ACID: CPT | Performed by: FAMILY MEDICINE

## 2023-08-31 PROCEDURE — 1159F PR MEDICATION LIST DOCUMENTED IN MEDICAL RECORD: ICD-10-PCS | Mod: CPTII,,, | Performed by: FAMILY MEDICINE

## 2023-08-31 PROCEDURE — 1159F MED LIST DOCD IN RCRD: CPT | Mod: CPTII,,, | Performed by: FAMILY MEDICINE

## 2023-08-31 PROCEDURE — 82306 VITAMIN D 25 HYDROXY: CPT | Performed by: FAMILY MEDICINE

## 2023-08-31 PROCEDURE — 99213 OFFICE O/P EST LOW 20 MIN: CPT | Mod: 25,,, | Performed by: FAMILY MEDICINE

## 2023-08-31 PROCEDURE — 86140 C-REACTIVE PROTEIN: CPT | Performed by: FAMILY MEDICINE

## 2023-08-31 PROCEDURE — 82550 ASSAY OF CK (CPK): CPT | Performed by: FAMILY MEDICINE

## 2023-08-31 PROCEDURE — 80053 COMPREHEN METABOLIC PANEL: CPT | Performed by: FAMILY MEDICINE

## 2023-08-31 PROCEDURE — 36415 COLL VENOUS BLD VENIPUNCTURE: CPT | Performed by: FAMILY MEDICINE

## 2023-08-31 NOTE — PROGRESS NOTES
Patient ID: [id number]     Chief Complaint: Annual Exam        HPI:     Leta Mccarthy is a 38 y.o. female here today for annual wellness.   Well Adult History   -Patient presents for well adult exam, The patient's general health status is described as good. The patient's diet is described as balanced. Exercise: routine. Associated symptoms denies headache, denies vision changes, denies fatigue, denies fatigue and denies hearing loss. Last menstrual period: 08/13/2023, regular. Additional pertinent history: last dental exam: goes every 6 months, last eye exam: > 1 year ago (Doesn't wear corrective lens, she has vision insurance, she will schedule an appt. next available), last pap smear : 09/22/2022 (WNL with GYN (Dr. Ramirez)), seat belt use, occasional caffeine use (coffee), tobacco use none, alcohol use social and She is fasting for annual labs. She has a history of Vitamin D deficiency, would like her levels checked. She refuses COVID-19 booster and flu vaccines, but she is UTD on all other vaccines. She has a family history of breast cancer, was seen by Dr. 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 Sommers), had NL Colonoscopy, will do Colonoscopies every 5 years and had MRI breast scheduled for the end of this year.   - She is obese, she has gained 21 pounds since last visit, she would like to see a dietician, she is not interested in weight loss surgery, she is not interested in weight loss Rx.   -The patient presents with right neck and right upper extremity pain and low back pain radiating down right lower extremity. The location of pain is the right neck and right shoulder(s) and low back pain radiating down right lower extremity. The pain is characterized by aching, throbbing, feels like something is pulling in her neck, worse at night when she is trying to sleep. The severity of the pain is rated 6-7 / 10 on the pain scale. The pain occurred > 1 year ago). The  context of the pain: occurred was sleeping and denies injury. Exacerbating factors consist of sleeping. Relieving factors consist of none. Associated symptoms consist of denies decreased range of motion, denies joint instability, denies joint stiffness, denies loss of function, denies numbness and denies tingling. Prior treatment consists of none. Additional pertinent history: She has tried massages, OTC ointment, and OTC Tylenol/Motrin with some resolution of symptoms. She has had XR C-spine and XR right shoulder that showed DJD, and she has tried PT in the past without success.   - Patient is without any other complaints today.     Advance Care Planning     Date: 2023  Patient did not wish or was not able to name a surrogate decision maker or provide an Advance Care Plan.          ----------------------------  Cervical radiculopathy  Migraine without aura and responsive to treatment  Obesity  Vitamin D deficiency     Past Surgical History:   Procedure Laterality Date     SECTION       SECTION      HERNIA REPAIR      OOPHORECTOMY      TUBAL LIGATION         Review of patient's allergies indicates:  No Known Allergies    Outpatient Medications Marked as Taking for the 23 encounter (Office Visit) with Akilah Olivera MD   Medication Sig Dispense Refill    ascorbic acid, vitamin C, (VITAMIN C) 500 MG tablet Take 500 mg by mouth once daily.      cholecalciferol, vitamin D3, (VITAMIN D3) 50 mcg (2,000 unit) Cap capsule Take 2,000 Int'l Units by mouth once daily.      magnesium 30 mg Tab Take by mouth nightly as needed.      multivitamin with folic acid 400 mcg Tab Take 1 tablet by mouth once daily.      omega-3 fatty acids/fish oil (FISH OIL-OMEGA-3 FATTY ACIDS) 300-1,000 mg capsule Take 2 g by mouth once daily.      SUPREP BOWEL PREP KIT 17.5-3.13-1.6 gram SolR Take 1 kit by mouth.         Social History     Socioeconomic History    Marital status: Single   Tobacco Use    Smoking  status: Never    Smokeless tobacco: Never   Substance and Sexual Activity    Alcohol use: Not Currently    Drug use: Never    Sexual activity: Yes     Partners: Male     Birth control/protection: None     Social Determinants of Health     Financial Resource Strain: Low Risk  (8/31/2023)    Overall Financial Resource Strain (CARDIA)     Difficulty of Paying Living Expenses: Not hard at all   Food Insecurity: No Food Insecurity (8/31/2023)    Hunger Vital Sign     Worried About Running Out of Food in the Last Year: Never true     Ran Out of Food in the Last Year: Never true   Transportation Needs: No Transportation Needs (8/31/2023)    PRAPARE - Transportation     Lack of Transportation (Medical): No     Lack of Transportation (Non-Medical): No   Physical Activity: Sufficiently Active (8/31/2023)    Exercise Vital Sign     Days of Exercise per Week: 4 days     Minutes of Exercise per Session: 60 min   Social Connections: Moderately Isolated (8/31/2023)    Social Connection and Isolation Panel [NHANES]     Frequency of Communication with Friends and Family: More than three times a week     Frequency of Social Gatherings with Friends and Family: More than three times a week     Attends L[employer] day Services: More than 4 times per year     Active Member of Clubs or Organizations: No     Attends Club or Organization Meetings: Never     Marital Status: Never    Housing Stability: Low Risk  (8/31/2023)    Housing Stability Vital Sign     Unable to Pay for Housing in the Last Year: No     Number of Places Lived in the Last Year: 1     Unstable Housing in the Last Year: No        Family History   Problem Relation Age of Onset    Hypertension Mother     Osteoarthritis Mother     Vaginal cancer Mother 61    Arthritis Mother     Cancer Mother     Breast cancer Sister 44    Hypertension Brother     Heart disease Brother     Hypertension Brother     Breast cancer Maternal Aunt 45    Lung cancer Maternal Aunt     Ovarian cancer  Maternal Aunt     Cancer Maternal Aunt         Subjective:       Review of Systems:    See HPI for details    Constitutional: No fever, No chills, No fatigue.   Eye: No blurring, No visual disturbances.   Ear/Nose/Mouth/Throat: No decreased hearing, No ear pain, No nasal congestion, No sore throat.   Respiratory: No shortness of breath, No cough, No wheezing.   Cardiovascular: No chest pain, No palpitations, No peripheral edema.   Breast: Both breasts, No lump/ mass, No pain.   Nipple discharge: None.   Gastrointestinal: No nausea, No vomiting, No diarrhea, No constipation, No abdominal pain.   Genitourinary: No dysuria, No hematuria.   Gynecologic: Negative except as documented in history of present illness.   Hematology/Lymphatics: No bruising tendency, No bleeding tendency, No swollen lymph glands.   Endocrine: No excessive thirst, No polyuria, No excessive hunger.   Musculoskeletal: As per HPI; No decreased range of motion.   Integumentary: No rash, No pruritus.   Neurologic: No headache, No abnormal balance, No confusion.   Psychiatric: No anxiety, No depression, Not suicidal, No hallucinations.     All Other ROS: Negative except as stated in HPI.       Objective:     /81 (BP Location: Right arm, Patient Position: Sitting, BP Method: Large (Automatic))   Pulse 82   Ht 5' (1.524 m)   Wt 86 kg (189 lb 8 oz)   LMP 08/13/2023   SpO2 99%   BMI 37.01 kg/m²     Physical Exam    General: Alert and oriented, No acute distress.   Appearance: Obese.   Eye: Pupils are equal, round and reactive to light, Normal conjunctiva.   HENT: Normocephalic, Tympanic membranes are clear, Normal hearing, Oral mucosa is moist, No pharyngeal erythema.   Throat: Pharynx ( Not edematous, No exudate ).   Neck: Supple, No carotid bruit, No lymphadenopathy, No thyromegaly.   Respiratory: Lungs are clear to auscultation, Respirations are non-labored, Breath sounds are equal, Symmetrical chest wall expansion, No chest wall  tenderness.   Cardiovascular: Normal rate, Regular rhythm, No murmur, Good pulses equal in all extremities, No edema.   Gastrointestinal: Soft, Non-tender, Non-distended, Normal bowel sounds, No organomegaly.   Genitourinary: No costovertebral angle tenderness.   Musculoskeletal: Normal range of motion, Normal gait. Spine/torso exam: Cervical ( Right, Posterior, Mild, No deformity, No erythema, No swelling, Tenderness, Normal range of motion ). Lumbar ( Right, Posterior, Mild, No deformity, No erythema, No swelling, Tenderness, Normal range of motion, negative straight leg raise seated ).   Upper extremity exam: Shoulder ( Right, Posterior, Mild, No deformity, No erythema, No swelling, Tenderness, No crepitus, No numbness, No tingling, Normal range of motion ).   Neurologic: No focal deficits, Cranial Nerves II-XII are grossly intact.   Psychiatric: Cooperative, Appropriate mood & affect, Normal judgment, Non-suicidal.   Mood and affect: Calm.   Behavior: Relaxed.         Assessment:       ICD-10-CM ICD-9-CM   1. Wellness examination  Z00.00 V70.0   2. Vitamin D deficiency  E55.9 268.9   3. Class 2 obesity due to excess calories without serious comorbidity with body mass index (BMI) of 37.0 to 37.9 in adult  E66.09 278.00    Z68.37 V85.37   4. Cervical radiculopathy  M54.12 723.4   5. Chronic right shoulder pain  M25.511 719.41    G89.29 338.29   6. Right lumbar radiculopathy  M54.16 724.4   7. Dorsalgia, unspecified  M54.9 724.5        Plan:     Problem List Items Addressed This Visit          Neuro    Cervical radiculopathy (Chronic)    Relevant Orders    MRI Cervical Spine Without Contrast    C-Reactive Protein    ANTINUCLEAR ANTIBODIES    Uric Acid    CK       Endocrine    Vitamin D deficiency (Chronic)    Relevant Orders    Vitamin D    Obesity (Chronic)    Relevant Orders    Ambulatory referral/consult to Nutrition Services     Other Visit Diagnoses       Wellness examination    -  Primary    Relevant Orders     CBC Auto Differential    Comprehensive Metabolic Panel    Hemoglobin A1C    Lipid Panel    TSH    Urinalysis, Reflex to Urine Culture    Chronic right shoulder pain        Relevant Orders    MRI Shoulder Without Contrast Right    C-Reactive Protein    ANTINUCLEAR ANTIBODIES    Uric Acid    CK    Right lumbar radiculopathy        Relevant Orders    C-Reactive Protein    ANTINUCLEAR ANTIBODIES    Uric Acid    CK    Dorsalgia, unspecified        Relevant Orders    MRI Lumbar Spine Without Contrast         1. Wellness examination  - CBC Auto Differential; Future  - Comprehensive Metabolic Panel; Future  - Hemoglobin A1C; Future  - Lipid Panel; Future  - TSH; Future  - Urinalysis, Reflex to Urine Culture; Future  - Will treat pending lab results. Monthly breast self exam encouraged. Diet, exercise, and 10% weight loss encouraged. Keep appointment for dental exams x q6 months as scheduled. Keep appointment for annual eye exam as scheduled. Keep appointment with GYN for annual pap smear as well as specialists as scheduled. Notify M.D. or ER if temp greater than 100.4, or any acute illness.      2. Vitamin D deficiency  - Vitamin D; Future    3. Class 2 obesity due to excess calories without serious comorbidity with body mass index (BMI) of 37.0 to 37.9 in adult  - Ambulatory referral/consult to Nutrition Services; Future for dietary counseling.   Body mass index is 37.01 kg/m².  Goal BMI <30.  Exercise 5 times a week for 30 minutes per day.  Avoid soda, simple sugars, excessive rice, potatoes or bread. Limit fast foods and fried foods.  Choose complex carbs in moderation (example: green vegetables, beans, oatmeal). Eat plenty of fresh fruits and vegetables with lean meats daily.  Do not skip meals. Eat a balanced portion size.  Avoid fad diets. Consider permanent healthy life style changes.      4. Cervical radiculopathy  - MRI Cervical Spine Without Contrast; Future  - C-Reactive Protein; Future  - ANTINUCLEAR  ANTIBODIES; Future  - Uric Acid; Future  - CK; Future  - Stretching exercises encouraged, will treat pending results.     5. Chronic right shoulder pain  - MRI Shoulder Without Contrast Right; Future  - C-Reactive Protein; Future  - ANTINUCLEAR ANTIBODIES; Future  - Uric Acid; Future  - CK; Future  - Same as #4.     6. Right lumbar radiculopathy  - C-Reactive Protein; Future  - ANTINUCLEAR ANTIBODIES; Future  - Uric Acid; Future  - CK; Future  - Same as #4.    7. Dorsalgia, unspecified  - MRI Lumbar Spine Without Contrast; Future  - Same as #4.       Leta was seen today for annual exam.    Diagnoses and all orders for this visit:    Wellness examination  -     CBC Auto Differential; Future  -     Comprehensive Metabolic Panel; Future  -     Hemoglobin A1C; Future  -     Lipid Panel; Future  -     TSH; Future  -     Urinalysis, Reflex to Urine Culture; Future    Vitamin D deficiency  -     Vitamin D; Future    Class 2 obesity due to excess calories without serious comorbidity with body mass index (BMI) of 37.0 to 37.9 in adult  -     Ambulatory referral/consult to Nutrition Services; Future    Cervical radiculopathy  -     MRI Cervical Spine Without Contrast; Future  -     C-Reactive Protein; Future  -     ANTINUCLEAR ANTIBODIES; Future  -     Uric Acid; Future  -     CK; Future    Chronic right shoulder pain  -     MRI Shoulder Without Contrast Right; Future  -     C-Reactive Protein; Future  -     ANTINUCLEAR ANTIBODIES; Future  -     Uric Acid; Future  -     CK; Future    Right lumbar radiculopathy  -     C-Reactive Protein; Future  -     ANTINUCLEAR ANTIBODIES; Future  -     Uric Acid; Future  -     CK; Future    Dorsalgia, unspecified  -     MRI Lumbar Spine Without Contrast; Future          Medication List with Changes/Refills   Current Medications    ASCORBIC ACID, VITAMIN C, (VITAMIN C) 500 MG TABLET    Take 500 mg by mouth once daily.       Start Date: --        End Date: --    CHOLECALCIFEROL, VITAMIN  D3, (VITAMIN D3) 50 MCG (2,000 UNIT) CAP CAPSULE    Take 2,000 Int'l Units by mouth once daily.       Start Date: --        End Date: --    MAGNESIUM 30 MG TAB    Take by mouth nightly as needed.       Start Date: --        End Date: --    MULTIVITAMIN WITH FOLIC ACID 400 MCG TAB    Take 1 tablet by mouth once daily.       Start Date: --        End Date: --    OMEGA-3 FATTY ACIDS/FISH OIL (FISH OIL-OMEGA-3 FATTY ACIDS) 300-1,000 MG CAPSULE    Take 2 g by mouth once daily.       Start Date: --        End Date: --    SUPREP BOWEL PREP KIT 17.5-3.13-1.6 GRAM SOLR    Take 1 kit by mouth.       Start Date: 5/5/2023  End Date: --          Follow up in about 1 year (around 8/31/2024) for Wellness.

## 2023-09-04 LAB
ANTINUCLEAR ANTIBODY SCREEN (OHS): NEGATIVE
DSDNA AB QUANT (OHS): 1.8 IU/ML

## 2023-09-05 ENCOUNTER — TELEPHONE (OUTPATIENT)
Dept: FAMILY MEDICINE | Facility: CLINIC | Age: 38
End: 2023-09-05
Payer: COMMERCIAL

## 2023-09-05 DIAGNOSIS — R74.8 ELEVATED CPK: Primary | ICD-10-CM

## 2023-09-05 NOTE — TELEPHONE ENCOUNTER
----- Message from Akilah Olivera MD sent at 9/5/2023  9:01 AM CDT -----  JOHN (auto-immune disease) screening test is negative.

## 2023-09-06 ENCOUNTER — TELEPHONE (OUTPATIENT)
Dept: FAMILY MEDICINE | Facility: CLINIC | Age: 38
End: 2023-09-06
Payer: COMMERCIAL

## 2023-09-06 ENCOUNTER — CLINICAL SUPPORT (OUTPATIENT)
Dept: FAMILY MEDICINE | Facility: CLINIC | Age: 38
End: 2023-09-06
Payer: COMMERCIAL

## 2023-09-06 DIAGNOSIS — R74.8 ELEVATED CPK: Primary | ICD-10-CM

## 2023-09-06 PROCEDURE — 36415 COLL VENOUS BLD VENIPUNCTURE: CPT

## 2023-09-06 PROCEDURE — 82552 ASSAY OF CPK IN BLOOD: CPT | Performed by: FAMILY MEDICINE

## 2023-09-06 PROCEDURE — 82550 ASSAY OF CK (CPK): CPT | Performed by: FAMILY MEDICINE

## 2023-09-06 NOTE — TELEPHONE ENCOUNTER
----- Message from Akilah Olivera MD sent at 9/5/2023  5:41 PM CDT -----  Order for CK isoenzyme testing is in file for further evaluation. Patient can have done at her earliest convenience. Thanks.

## 2023-09-08 DIAGNOSIS — M25.511 CHRONIC RIGHT SHOULDER PAIN: Primary | ICD-10-CM

## 2023-09-08 DIAGNOSIS — G96.191 TARLOV CYSTS: ICD-10-CM

## 2023-09-08 DIAGNOSIS — N83.201 RIGHT OVARIAN CYST: ICD-10-CM

## 2023-09-08 DIAGNOSIS — M47.22 OSTEOARTHRITIS OF SPINE WITH RADICULOPATHY, CERVICAL REGION: ICD-10-CM

## 2023-09-08 DIAGNOSIS — G89.29 CHRONIC RIGHT SHOULDER PAIN: Primary | ICD-10-CM

## 2023-09-08 LAB
CK SERPL-CCNC: 207 U/L (ref 26–192)
SPECIMEN SOURCE: ABNORMAL

## 2023-09-08 RX ORDER — DICLOFENAC SODIUM 10 MG/G
2 GEL TOPICAL 4 TIMES DAILY PRN
Qty: 100 G | Refills: 1 | Status: SHIPPED | OUTPATIENT
Start: 2023-09-08

## 2023-09-11 ENCOUNTER — TELEPHONE (OUTPATIENT)
Dept: FAMILY MEDICINE | Facility: CLINIC | Age: 38
End: 2023-09-11
Payer: COMMERCIAL

## 2023-09-11 ENCOUNTER — PATIENT MESSAGE (OUTPATIENT)
Dept: FAMILY MEDICINE | Facility: CLINIC | Age: 38
End: 2023-09-11
Payer: COMMERCIAL

## 2023-09-11 NOTE — TELEPHONE ENCOUNTER
Patient notified of results, verbalized understanding.  Does not wish to be referred to surgeon at this time. Referral for PT sent to [facility]. Referral sent .

## 2023-09-11 NOTE — TELEPHONE ENCOUNTER
----- Message from Akilah Olivera MD sent at 9/11/2023  1:07 PM CDT -----  CPK is improved from previous, but still mildly elevated, 207, was 212 previously, isoenzyme testing still pending, please followup with lab regarding status of CK isoenzyme testing.

## 2023-09-11 NOTE — TELEPHONE ENCOUNTER
----- Message from Akilah Olivera MD sent at 9/8/2023 12:24 PM CDT -----  MRI right shoulder shows tendinitis and bursitis of her right shoulder. Remaining MRI right shoulder is normal, Rx Voltaren Gel sent for pain, referral to Physical therapy for evaluation and treatment is in file.   MRI C-spine (neck) shows evidence of muscles spasms in her neck and multilevel disc space narrowing with disc desiccation (bulging discs) at C3-C4, C4-C5, C6-C6 and C6-C7 levels, referral to Physical therapy for evaluation and treatment is in file, if no improvement with Physical therapy will proceed with pain management referral for evaluation for possible injection.  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 Perineural (Tarlov) cysts are benign, usually asymptomatic, cerebrospinal fluid filled cysts of the spine, most often found in the sacral or low back region.The exact cause of Tarlov cysts is unknown. Several theories exist including that the cysts result from an inflammatory process within the nerve root sheath or that trauma injures the nerve root sheath and causes leaking of cerebrospinal fluid (CSF) into the area where a cyst forms. If she has pain or numbness, we can refer her to a spine specialist for evaluation for removal, please advise.    She also has thickness of her right ovary, will need ultrasound of her pelvis for further evaluation, order for ultrasound pelvis is in file.   - Remaining findings are normal.

## 2023-09-12 LAB
CK BB CFR SERPL ELPH: 0 %
CK MB CFR SERPL ELPH: 0 %
CK MM CFR SERPL ELPH: 100 %

## 2023-09-13 ENCOUNTER — TELEPHONE (OUTPATIENT)
Dept: FAMILY MEDICINE | Facility: CLINIC | Age: 38
End: 2023-09-13
Payer: COMMERCIAL

## 2023-09-13 NOTE — TELEPHONE ENCOUNTER
----- Message from Akilah Olivera MD sent at 9/13/2023  8:23 AM CDT -----  If she is in Physical therapy, she can hold off on her work out regimen until therapy is completed and we can repeat her levels after Physical therapy is completed to evaluate for improvement. Thanks.   ----- Message -----  From: Kelly Crockett LPN  Sent: 9/13/2023   7:52 AM CDT  To: Akilah Olivera MD    Pt states she does work out and asked if she should refrain from working out at this time or is there certain things she should not be doing? She is in physical therapy also, she is just worried she may be over doing it and that's why the numbers are elevated. Please advise Thanks  ----- Message -----  From: Akilah Olivera MD  Sent: 9/12/2023   5:15 PM CDT  To: Joselin WISEMAN Staff    CK isoenzymes shows 100% CK- MM. Creatine Kinase is a type of protein that is mostly found in the skeletal muscle (CK-MM). 97 to 100% of Creatine Kinase are usually found in skeletal muscle. Skeletal muscles are the muscles attached to your skeleton. They work with your bones to help you move and give your body power and strength. CK-MM isoenzyme makes up almost all the circulatory total CK in the healthy person. CK-MB was 0%, and that is found in the heart muscle. CK-BB was 0% and that is found in brain tissue.

## 2023-09-25 ENCOUNTER — TELEPHONE (OUTPATIENT)
Dept: FAMILY MEDICINE | Facility: CLINIC | Age: 38
End: 2023-09-25
Payer: COMMERCIAL

## 2023-09-25 NOTE — TELEPHONE ENCOUNTER
----- Message from Akilah Olivera MD sent at 9/25/2023  1:12 PM CDT -----  Ultrasound pelvis shows small uterine fibroids, normal appearing ovaries, please forward results to her GYN (Dr. Ramirez) for review and treatment.

## 2023-12-14 ENCOUNTER — APPOINTMENT (OUTPATIENT)
Dept: RADIOLOGY | Facility: HOSPITAL | Age: 38
End: 2023-12-14
Attending: STUDENT IN AN ORGANIZED HEALTH CARE EDUCATION/TRAINING PROGRAM
Payer: COMMERCIAL

## 2023-12-14 DIAGNOSIS — Z91.89 AT HIGH RISK FOR BREAST CANCER: ICD-10-CM

## 2023-12-14 DIAGNOSIS — Z12.39 BREAST CANCER SCREENING, HIGH RISK PATIENT: ICD-10-CM

## 2023-12-14 DIAGNOSIS — Z80.3 FAMILY HISTORY OF BREAST CANCER: ICD-10-CM

## 2023-12-14 PROCEDURE — 77049 MRI BREAST C-+ W/CAD BI: CPT | Mod: 26,,, | Performed by: STUDENT IN AN ORGANIZED HEALTH CARE EDUCATION/TRAINING PROGRAM

## 2023-12-14 PROCEDURE — 25500020 PHARM REV CODE 255: Performed by: STUDENT IN AN ORGANIZED HEALTH CARE EDUCATION/TRAINING PROGRAM

## 2023-12-14 PROCEDURE — A9577 INJ MULTIHANCE: HCPCS | Performed by: STUDENT IN AN ORGANIZED HEALTH CARE EDUCATION/TRAINING PROGRAM

## 2023-12-14 PROCEDURE — 77049 MRI BREAST W/WO CONTRAST, W/CAD, BILATERAL: ICD-10-PCS | Mod: 26,,, | Performed by: STUDENT IN AN ORGANIZED HEALTH CARE EDUCATION/TRAINING PROGRAM

## 2023-12-14 PROCEDURE — 77049 MRI BREAST C-+ W/CAD BI: CPT | Mod: TC

## 2023-12-14 RX ADMIN — GADOBENATE DIMEGLUMINE 19 ML: 529 INJECTION, SOLUTION INTRAVENOUS at 01:12

## 2024-02-01 ENCOUNTER — PATIENT MESSAGE (OUTPATIENT)
Dept: FAMILY MEDICINE | Facility: CLINIC | Age: 39
End: 2024-02-01

## 2024-02-01 ENCOUNTER — TELEPHONE (OUTPATIENT)
Dept: FAMILY MEDICINE | Facility: CLINIC | Age: 39
End: 2024-02-01
Payer: COMMERCIAL

## 2024-02-01 ENCOUNTER — E-VISIT (OUTPATIENT)
Dept: FAMILY MEDICINE | Facility: CLINIC | Age: 39
End: 2024-02-01
Payer: COMMERCIAL

## 2024-02-01 ENCOUNTER — OFFICE VISIT (OUTPATIENT)
Dept: SURGERY | Facility: CLINIC | Age: 39
End: 2024-02-01
Payer: COMMERCIAL

## 2024-02-01 VITALS
DIASTOLIC BLOOD PRESSURE: 80 MMHG | RESPIRATION RATE: 18 BRPM | HEIGHT: 60 IN | HEART RATE: 76 BPM | TEMPERATURE: 98 F | WEIGHT: 194 LBS | BODY MASS INDEX: 38.09 KG/M2 | OXYGEN SATURATION: 99 % | SYSTOLIC BLOOD PRESSURE: 125 MMHG

## 2024-02-01 DIAGNOSIS — R92.30 DENSE BREAST TISSUE: ICD-10-CM

## 2024-02-01 DIAGNOSIS — Z12.39 OTHER SCREENING BREAST EXAMINATION: ICD-10-CM

## 2024-02-01 DIAGNOSIS — Z80.3 FAMILY HISTORY OF BREAST CANCER: ICD-10-CM

## 2024-02-01 DIAGNOSIS — R05.9 COUGH, UNSPECIFIED TYPE: Primary | ICD-10-CM

## 2024-02-01 DIAGNOSIS — Z12.31 SCREENING MAMMOGRAM FOR HIGH-RISK PATIENT: Primary | ICD-10-CM

## 2024-02-01 PROCEDURE — 1159F MED LIST DOCD IN RCRD: CPT | Mod: CPTII,S$GLB,, | Performed by: STUDENT IN AN ORGANIZED HEALTH CARE EDUCATION/TRAINING PROGRAM

## 2024-02-01 PROCEDURE — 3008F BODY MASS INDEX DOCD: CPT | Mod: CPTII,S$GLB,, | Performed by: STUDENT IN AN ORGANIZED HEALTH CARE EDUCATION/TRAINING PROGRAM

## 2024-02-01 PROCEDURE — 1160F RVW MEDS BY RX/DR IN RCRD: CPT | Mod: CPTII,S$GLB,, | Performed by: STUDENT IN AN ORGANIZED HEALTH CARE EDUCATION/TRAINING PROGRAM

## 2024-02-01 PROCEDURE — 99999 PR PBB SHADOW E&M-EST. PATIENT-LVL V: CPT | Mod: PBBFAC,,, | Performed by: STUDENT IN AN ORGANIZED HEALTH CARE EDUCATION/TRAINING PROGRAM

## 2024-02-01 PROCEDURE — 3079F DIAST BP 80-89 MM HG: CPT | Mod: CPTII,S$GLB,, | Performed by: STUDENT IN AN ORGANIZED HEALTH CARE EDUCATION/TRAINING PROGRAM

## 2024-02-01 PROCEDURE — 99421 OL DIG E/M SVC 5-10 MIN: CPT | Mod: ,,, | Performed by: FAMILY MEDICINE

## 2024-02-01 PROCEDURE — 3074F SYST BP LT 130 MM HG: CPT | Mod: CPTII,S$GLB,, | Performed by: STUDENT IN AN ORGANIZED HEALTH CARE EDUCATION/TRAINING PROGRAM

## 2024-02-01 PROCEDURE — 99213 OFFICE O/P EST LOW 20 MIN: CPT | Mod: S$GLB,,, | Performed by: STUDENT IN AN ORGANIZED HEALTH CARE EDUCATION/TRAINING PROGRAM

## 2024-02-01 RX ORDER — DOXYCYCLINE 100 MG/1
100 CAPSULE ORAL EVERY 12 HOURS
Qty: 14 CAPSULE | Refills: 0 | Status: SHIPPED | OUTPATIENT
Start: 2024-02-01 | End: 2024-02-08

## 2024-02-01 RX ORDER — PROMETHAZINE HYDROCHLORIDE AND DEXTROMETHORPHAN HYDROBROMIDE 6.25; 15 MG/5ML; MG/5ML
5 SYRUP ORAL EVERY 8 HOURS PRN
Qty: 120 ML | Refills: 0 | Status: SHIPPED | OUTPATIENT
Start: 2024-02-01

## 2024-02-01 RX ORDER — ALBUTEROL SULFATE 90 UG/1
2 AEROSOL, METERED RESPIRATORY (INHALATION) EVERY 6 HOURS PRN
Qty: 18 G | Refills: 0 | Status: SHIPPED | OUTPATIENT
Start: 2024-02-01 | End: 2025-01-31

## 2024-02-01 NOTE — TELEPHONE ENCOUNTER
----- Message from Karyna Gibson sent at 2/1/2024  9:12 AM CST -----  Regarding: advice  .Type:  Needs Medical Advice    Who Called: Pt    Symptoms (please be specific): lingering symptoms;      How long has patient had these symptoms:  n/a    Pharmacy name and phone #:  n/a    Would the patient rather a call back or a response via [facility]?     Best Call Back Number: [phone]    Additional Information: Pt states that she would like to get some advice on how to deal w/ lingering symptoms or possible medication; She stated that she'd like to speak w/ a nurse or Dr. Olivera, if possible. Please advise.

## 2024-02-01 NOTE — PROGRESS NOTES
Patient ID: Leta Mccarthy is a 38 y.o. female.    Chief Complaint: Cough    The patient initiated a request through [email or web address] on 2/1/2024 for evaluation and management with a chief complaint of Cough     I evaluated the questionnaire submission on 02/01/2024.    [facility]    2/1/2024 11:18 AM CST - Filed by Patient   Do you agree to participate in an E-Visit? Yes   If you have any of the following symptoms, please present to your local ER or call 911:  I acknowledge   Choose the state of your primary residence [place]   What is the main issue that you would like for your doctor to address today? Lingering symptoms after flu   Are you able to take your vital signs? No   Are you currently pregnant, could you be pregnant, or are you breast feeding? None of the above   Please describe your symptoms Post virus syndrome, Sever Fatigue, consistent cough, sore throat, woozy like feeling in head that is sporadic, brain fog   Where is your problem located? See explanation of symptoms   How severe are your symptoms? Mild   Have you had these symptoms before? No   How long have you been having these symptoms? For a few days   Please list any medications or treatments you have used for your condition and indicate if it was effective or not. Mucinex-not effective, delsum-not effective, prescribed Ninjacof-not effective,   What makes this feel better? Extra rest/sleep   What makes this feel worse? Physical activity   Are these symptoms related to a condition that you currently have? No   Please describe any probable cause for these symptoms Started after being diagnosed with flu   Provide any information you feel is important to your history not asked above    Please attach any relevant images or files          Encounter Diagnosis   Name Primary?    Cough, unspecified type Yes        Orders Placed This Encounter   Procedures    X-Ray Chest PA And Lateral     Standing Status:   Future     Standing Expiration Date:    2/1/2025     Order Specific Question:   May the Radiologist modify the order per protocol to meet the clinical needs of the patient?     Answer:   Yes     Order Specific Question:   Release to patient     Answer:   Immediate      Medications Ordered This Encounter   Medications    albuterol (VENTOLIN HFA) 90 mcg/actuation inhaler     Sig: Inhale 2 puffs into the lungs every 6 (six) hours as needed for Wheezing. Rescue     Dispense:  18 g     Refill:  0    doxycycline (MONODOX) 100 MG capsule     Sig: Take 1 capsule (100 mg total) by mouth every 12 (twelve) hours. for 7 days     Dispense:  14 capsule     Refill:  0    promethazine-dextromethorphan (PROMETHAZINE-DM) 6.25-15 mg/5 mL Syrp     Sig: Take 5 mLs by mouth every 8 (eight) hours as needed (cough).     Dispense:  120 mL     Refill:  0       I ordered a Chest X-ray she needs to have done at any [facility] Facility of her choice, except for Urgent Care Centers. Increase fluid intake. I sent an antibiotic (Doxycycline), cough medication (Promethazine DM), and an inhaler (Albuterol) to help her symptoms. Will treat pending results. Notify M.D. or ER if symptoms persist or worsen, shortness of breath, chest pain, coughing up blood, temp >100.4, or any acute illness.        Follow up if symptoms worsen or fail to improve.      E-Visit Time Tracking:    Day 1 Time (in minutes): 10    Total Time (in minutes): 10

## 2024-02-01 NOTE — PROGRESS NOTES
[facility] - Breast [place] [facility]  Breast Surgical Oncology  FU Patient Office Visit - H&P    PCP: Akilah Olivera, PCP  CALEB Walsh     Chief Complaint:   Chief Complaint   Patient presents with    Follow-up     High Risk follow up visit. Patient reports no c/o breast issues today.        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     Interval History:  Leta Mccarthy is a 38 y.o.female who is here today for high risk FU.  She is doing well in the interval.  She denies any other breast issues including rashes, redness, swelling, nipple discharge, or new lumps/masses. She had recent MRI in Dec which did not show any suspicious findings.  She is still getting breast tenderness around the time of her periods.  Her mother was recently diagnosed with stage IV cancer and moved in with her.     History of Present Illness:  Leta Mccarthy  is a pleasant female patient who initially presents on  2024 at 38 y.o. years old for evaluation and assessment of risk for breast cancer based on  the Tyrer - Cuzick Breast Cancer Risk Model (> 20% indicates elevated lifetime risk). Her lifetime risk is calculated to be 29.4%. Her 5 year Karma score is 0.5%.   History of breast cancer in a sister and maternal aunt.  She has two boys.     Imagin/10/2022 SCR MG at [facility]- Negative.    Bilateral screening mammogram 2023:  Density D.  No suspicious findings.  BI-RADS 1.  MRI Breast 2023: no suspicious findings. BIRADS 1.     I have reviewed the imaging and agree with the radiologists interpretation. I have discussed these results with the patient.    Pathology:   none    OB / GYN History   Menarche Onset:13  Menopause: Menopause: premenopausal  Hormonal birth control (duration): 1 years  Pregnancies: 2  Age at first child birth: 30  Child  births: 2  Hysterectomy/Oophorectomy: no- tubes were removed around age 34  HRT: no    Family History of Cancer (& age at diagnosis):  - Mother Vaginal Cancer  at age 60   Aunt maternal Breast at age 40 dunia   Aunt maternal Ovarian at age 60 dunia   Aunt maternal Lung cancer at age 60 dunia   Aunt maternal unknown at age unknown   Sister paternal Breast cancer  at age 45    Family History   Problem Relation Age of Onset    Hypertension Mother     Osteoarthritis Mother     Vaginal cancer Mother 61    Arthritis Mother     Cancer Mother     Breast cancer Sister 44    Hypertension Brother     Heart disease Brother     Hypertension Brother     Breast cancer Maternal Aunt 45    Lung cancer Maternal Aunt     Ovarian cancer Maternal Aunt     Cancer Maternal Aunt         Lifestyle:  Height and Weight: 5 foot 171 lbs   BMI: Body mass index is 37.89 kg/m².     Other:  # of breast biopsies (when and pathology results): 0  MG breast density:  BIRADS D  Prior thoracic RT: none  Genetic testing: no  Ashkenazi Ep[employer] descent: No    Other History:     Past Medical History:   Diagnosis Date    Cervical radiculopathy 2022    Migraine without aura and responsive to treatment 2022    Obesity 2022    Vitamin D deficiency 2022        Past Surgical History:   Procedure Laterality Date     SECTION       SECTION      HERNIA REPAIR      OOPHORECTOMY      TUBAL LIGATION          Social History     Socioeconomic History    Marital status: Single   Tobacco Use    Smoking status: Never    Smokeless tobacco: Never   Substance and Sexual Activity    Alcohol use: Not Currently    Drug use: Never    Sexual activity: Yes     Partners: Male     Birth control/protection: None     Social Determinants of Health     Financial Resource Strain: Low Risk  (2023)    Overall Financial Resource Strain (CARDIA)     Difficulty of Paying Living Expenses: Not hard at all   Food Insecurity: No Food Insecurity (2023)     Hunger Vital Sign     Worried About Running Out of Food in the Last Year: Never true     Ran Out of Food in the Last Year: Never true   Transportation Needs: No Transportation Needs (8/31/2023)    PRAPARE - Transportation     Lack of Transportation (Medical): No     Lack of Transportation (Non-Medical): No   Physical Activity: Sufficiently Active (8/31/2023)    Exercise Vital Sign     Days of Exercise per Week: 4 days     Minutes of Exercise per Session: 60 min   Social Connections: Moderately Isolated (8/31/2023)    Social Connection and Isolation Panel [NHANES]     Frequency of Communication with Friends and Family: More than three times a week     Frequency of Social Gatherings with Friends and Family: More than three times a week     Attends [employer] Services: More than 4 times per year     Active Member of Clubs or Organizations: No     Attends Club or Organization Meetings: Never     Marital Status: Never    Housing Stability: Low Risk  (8/31/2023)    Housing Stability Vital Sign     Unable to Pay for Housing in the Last Year: No     Number of Places Lived in the Last Year: 1     Unstable Housing in the Last Year: No        Immunization History   Administered Date(s) Administered    COVID-19, MRNA, LN-S, PF (MODERNA FULL 0.5 ML DOSE) 11/20/2022, 12/18/2022    Influenza - Quadrivalent - PF *Preferred* (6 months and older) 09/04/2019    Tdap 07/06/2016        Medications/Allergies:       Current Outpatient Medications:     ascorbic acid, vitamin C, (VITAMIN C) 500 MG tablet, Take 500 mg by mouth once daily., Disp: , Rfl:     cholecalciferol, vitamin D3, (VITAMIN D3) 50 mcg (2,000 unit) Cap capsule, Take 2,000 Int'l Units by mouth once daily., Disp: , Rfl:     diclofenac sodium (VOLTAREN) 1 % Gel, Apply 2 g topically 4 (four) times daily as needed (pain/inflammation)., Disp: 100 g, Rfl: 1    magnesium 30 mg Tab, Take by mouth nightly as needed., Disp: , Rfl:     multivitamin with folic acid 400 mcg Tab,  Take 1 tablet by mouth once daily., Disp: , Rfl:     omega-3 fatty acids/fish oil (FISH OIL-OMEGA-3 FATTY ACIDS) 300-1,000 mg capsule, Take 2 g by mouth once daily., Disp: , Rfl:     albuterol (VENTOLIN HFA) 90 mcg/actuation inhaler, Inhale 2 puffs into the lungs every 6 (six) hours as needed for Wheezing. Rescue (Patient not taking: Reported on 2/1/2024), Disp: 18 g, Rfl: 0    doxycycline (MONODOX) 100 MG capsule, Take 1 capsule (100 mg total) by mouth every 12 (twelve) hours. for 7 days (Patient not taking: Reported on 2/1/2024), Disp: 14 capsule, Rfl: 0    promethazine-dextromethorphan (PROMETHAZINE-DM) 6.25-15 mg/5 mL Syrp, Take 5 mLs by mouth every 8 (eight) hours as needed (cough). (Patient not taking: Reported on 2/1/2024), Disp: 120 mL, Rfl: 0    Review of patient's allergies indicates:  No Known Allergies     Review of Systems:      Normal unless otherwise noted in HPI        Objective/Physical Exam     Vitals:    02/01/24 1251   BP: 125/80   Pulse: 76   Resp: 18   Temp: 98 °F (36.7 °C)           General: The patient is awake, alert and oriented times three. The patient is well nourished and in no acute distress.  Neck: There is no evidence of palpable cervical, supraclavicular or axillary adenopathy. The neck is supple. The thyroid is not enlarged.  Musculoskeletal: The patient has a normal range of motion of her bilateral upper extremities.  Chest: Examination of the chest wall fails to reveal any obvious abnormalities. Nonlabored breathing, symmetric expansion.  Lungs: clear bilaterally   Heart: RRR  Breast Exam: The patient's breasts are symmetric.    Right: On inspection, there is no skin dimpling, rashes, retraction, erythema or skin abnormalities. The nipple areolar complex is normal. The breast moves normally with movement of the pectoralis muscle.  On palpation there are no dominant masses. There is no warmth, tenderness or fluctuance in the breast. There is no nipple discharge.  Left: On  inspection, there is no skin dimpling, rashes, retraction, erythema or skin abnormalities. The nipple areolar complex is normal. The breast moves normally with movement of the pectoralis muscle.  On palpation there are no dominant masses. There in some tenderness in the LIQ.  There is no warmth or fluctuance in the breast. There is no nipple discharge.       Assessment and Plan     Patient Active Problem List   Diagnosis    Cervical radiculopathy    Vitamin D deficiency    Low back pain    Migraine without aura and responsive to treatment    Obesity    Night sweats    Tarlov cysts    Osteoarthritis of spine with radiculopathy, cervical region       Leta was seen today for follow-up.    Diagnoses and all orders for this visit:    Screening mammogram for high-risk patient  -     Mammo Digital Screening Bilat w/ Marcello; Future    Other screening breast examination  -     US BREAST SCREENING BILATERAL; Future    Dense breast tissue    Family history of breast cancer        Leta Mccarthy is a 38 y.o.female who is here today for high risk follow up.  There are no concerning findings on her breast exam or on recent imaging.  Today Leta Mccarthy was plugged into the [employer] risk calculator and her calculated risk of breast cancer based on Tyrer - Cuzick Breast Cancer Risk Model was 29%.  She understands that >20% is considered high risk.  Today we again discussed risk factors associated with the development of breast cancer and risk reduction strategies.       PLAN:     1. Lifestyle - Healthy lifestyle guidelines were reviewed. She was encouraged to engage in regular exercise, maintain a healthy body weight, and avoid excessive alcohol consumption. Healthy nutritional guidelines were also discussed.      2. Imaging:  She would like to continue with high-risk screening imaging but would like to do MRI every other year.  So will plan for mammogram and whole breast US this summer - 07/19/2024 and next MRI  in Dec 2025.      3. Recommend 2 clinical breast exams a year. She sees her OBGYN in the Fall.  RTC in one year.      The patient was encouraged to continue her self breast exam. If she notes any changes or has any concerns with her breast in the interim she was encouraged to call the breast center to schedule an appointment as soon as possible. All of her questions were answered.       Katt Acuña MD  Breast surgical oncology      I spent a total of 25 minutes on the day of the visit.  This includes face to face time and non-face to face time preparing to see the patient (eg, review of tests), obtaining and/or reviewing separately obtained history, documenting clinical information in the electronic or other health record, independently interpreting results and communicating results to the patient/family/caregiver, or care coordinator.

## 2024-04-30 ENCOUNTER — OFFICE VISIT (OUTPATIENT)
Dept: FAMILY MEDICINE | Facility: CLINIC | Age: 39
End: 2024-04-30
Payer: COMMERCIAL

## 2024-04-30 VITALS
DIASTOLIC BLOOD PRESSURE: 90 MMHG | HEIGHT: 60 IN | OXYGEN SATURATION: 99 % | SYSTOLIC BLOOD PRESSURE: 120 MMHG | BODY MASS INDEX: 38.46 KG/M2 | HEART RATE: 83 BPM | WEIGHT: 195.88 LBS

## 2024-04-30 DIAGNOSIS — F43.9 STRESS: ICD-10-CM

## 2024-04-30 DIAGNOSIS — J06.9 UPPER RESPIRATORY TRACT INFECTION, UNSPECIFIED TYPE: ICD-10-CM

## 2024-04-30 DIAGNOSIS — R05.9 COUGH, UNSPECIFIED TYPE: Primary | ICD-10-CM

## 2024-04-30 PROCEDURE — 3080F DIAST BP >= 90 MM HG: CPT | Mod: CPTII,,,

## 2024-04-30 PROCEDURE — 1159F MED LIST DOCD IN RCRD: CPT | Mod: CPTII,,,

## 2024-04-30 PROCEDURE — 3008F BODY MASS INDEX DOCD: CPT | Mod: CPTII,,,

## 2024-04-30 PROCEDURE — 3074F SYST BP LT 130 MM HG: CPT | Mod: CPTII,,,

## 2024-04-30 PROCEDURE — 99213 OFFICE O/P EST LOW 20 MIN: CPT | Mod: ,,,

## 2024-04-30 RX ORDER — DOXYCYCLINE 100 MG/1
100 CAPSULE ORAL 2 TIMES DAILY
Qty: 14 CAPSULE | Refills: 0 | Status: SHIPPED | OUTPATIENT
Start: 2024-04-30

## 2024-04-30 RX ORDER — LEVOCETIRIZINE DIHYDROCHLORIDE 5 MG/1
5 TABLET, FILM COATED ORAL NIGHTLY
Qty: 30 TABLET | Refills: 11 | Status: SHIPPED | OUTPATIENT
Start: 2024-04-30 | End: 2025-04-30

## 2024-04-30 NOTE — ASSESSMENT & PLAN NOTE
Levocetirizine (XYZAL) 5 MG tablet; Take 1 tablet (5 mg total) by mouth every evening.          Drink plenty of water to thin secretions.   Fluticasone propionate 50 mcg; 1 spray each nostril daily. Has medication at home.  Use rescue inhaler as needed.   Avoid individuals who are ill.   Use humidifier to moisten air.  Wash hands thoroughly.  Wear a mask.  Nasal washings daily as discussed; as needed.   Drink plenty of fluids to thin secretions.   Go to the emergency department if symptoms of chest pain/tightness, shortness of breath, fever/chills, temp >100.4 or any acute illness.

## 2024-04-30 NOTE — ASSESSMENT & PLAN NOTE
Contact office with the name of therapist for BH referral.   Denies SI/HI, AH/VH.    Recommend relaxation techniques and coping strategies (i.e. essential oils, deep breathing, exercise, etc).   Practice positive phrases and repeat throughout the day, yoga, lavender scents or Chamomile tea will help anxiety.  Set healthy boundaries, avoid people and conversations that increase stress.    Practice deep breathing or abdominal breathing exercises when stressors occurs.  Exercise daily. Get sunlight daily.  Avoid caffeine, alcohol and stimulants.  Seek emergency medical treatment for SOB, persistent panic attack, chest pain, suicidal thoughts or hallucinations.

## 2024-04-30 NOTE — ASSESSMENT & PLAN NOTE
doxycycline (VIBRAMYCIN) 100 MG Cap; Take 1 capsule (100 mg total) by mouth 2 (two) times daily.   Take antibiotic until complete. Take probiotic daily while taking antibiotic.

## 2024-04-30 NOTE — PROGRESS NOTES
Patient ID: [id number]     Chief Complaint: Cough    HPI:     Leta Mccarthy is a 39 y.o. female here today with c/o cough. Onset of cough started in January when her son was diagnosed with the flu. She was evaluated for flu result negative. Symptoms began to improve but never resolved. Associated symptoms pnd, rhinorrhea. She was prescribed antibiotics. She reports stopping the antibiotics after 3 days because she felt better. Today she presents with the same symptoms with the associated symptom of fever. She self medicated with albuterol, delsym, musinex, ninja cough, zyrtec, all of which provided no relief.      She reports stress, anxiety, mood change at the onset of stress and fatigue. She denies depression, SI/HI or AH/VH. Discussed treatment options. She denies prescription but would like referral to behavioral health. She will contact the office with the name of the therapist she would like to speak to see.     No other complaints today.     Past Medical History:   Diagnosis Date    Cervical radiculopathy 2022    Migraine without aura and responsive to treatment 2022    Obesity 2022    Vitamin D deficiency 2022        Past Surgical History:   Procedure Laterality Date     SECTION       SECTION      HERNIA REPAIR      OOPHORECTOMY      TUBAL LIGATION          Social History     Tobacco Use    Smoking status: Never    Smokeless tobacco: Never   Substance and Sexual Activity    Alcohol use: Not Currently    Drug use: Never    Sexual activity: Yes     Partners: Male     Birth control/protection: None        Current Outpatient Medications   Medication Instructions    albuterol (VENTOLIN HFA) 90 mcg/actuation inhaler 2 puffs, Inhalation, Every 6 hours PRN, Rescue    ascorbic acid (vitamin C) (VITAMIN C) 500 mg, Oral, Daily    cholecalciferol, vitamin D3, (VITAMIN D3) 50 mcg (2,000 unit) Cap capsule 2,000 Int'l Units, Oral, Daily    diclofenac sodium (VOLTAREN) 2 g,  Topical (Top), 4 times daily PRN    doxycycline (VIBRAMYCIN) 100 mg, Oral, 2 times daily    levocetirizine (XYZAL) 5 mg, Oral, Nightly    magnesium 30 mg Tab Oral, Nightly PRN    multivitamin with folic acid 400 mcg Tab 1 tablet, Oral, Daily    omega-3 fatty acids/fish oil (FISH OIL-OMEGA-3 FATTY ACIDS) 300-1,000 mg capsule 2 g, Oral, Daily    promethazine-dextromethorphan (PROMETHAZINE-DM) 6.25-15 mg/5 mL Syrp 5 mLs, Oral, Every 8 hours PRN       Review of patient's allergies indicates:  No Known Allergies     Patient Care Team:  Akilah Olivera MD as PCP - General (Family Medicine)     Subjective:     Review of Systems   Constitutional:  Positive for fatigue. Negative for appetite change.   HENT:  Positive for congestion, mouth sores, postnasal drip, rhinorrhea and sore throat. Negative for ear pain and sinus pain.    Eyes:  Positive for redness. Negative for pain.   Respiratory:  Positive for cough and wheezing. Negative for chest tightness and shortness of breath.    Cardiovascular:  Positive for palpitations.   Gastrointestinal:  Negative for abdominal pain, constipation, diarrhea, nausea and vomiting.   Endocrine: Negative for polydipsia, polyphagia and polyuria.   Genitourinary:  Negative for dysuria, frequency and urgency.   Musculoskeletal: Negative.    Skin: Negative.    Neurological:  Positive for weakness and headaches. Negative for dizziness, syncope and light-headedness.   Psychiatric/Behavioral:  Negative for confusion, hallucinations and suicidal ideas. The patient is nervous/anxious.        12 point review of systems conducted, negative except as stated in the history of present illness. See HPI for details.    Objective:     Visit Vitals  BP (!) 120/90 (BP Location: Left arm, Patient Position: Sitting, BP Method: Large (Automatic))   Pulse 83   Ht 5' (1.524 m)   Wt 88.9 kg (195 lb 14.4 oz)   LMP 04/03/2024 (Exact Date)   SpO2 99%   BMI 38.26 kg/m²       Physical Exam  Constitutional:        General: She is not in acute distress.     Appearance: She is obese. She is ill-appearing.   HENT:      Head: Normocephalic.      Right Ear: Tympanic membrane and ear canal normal.      Left Ear: Tympanic membrane and ear canal normal.      Nose: Nose normal.      Mouth/Throat:      Mouth: Mucous membranes are moist.   Eyes:      Extraocular Movements: Extraocular movements intact.   Neck:      Comments: Non-tender  Cardiovascular:      Rate and Rhythm: Normal rate and regular rhythm.   Pulmonary:      Breath sounds: Normal breath sounds. No wheezing or rhonchi.   Abdominal:      General: Bowel sounds are normal.      Palpations: Abdomen is soft.   Musculoskeletal:         General: Normal range of motion.      Cervical back: Normal range of motion.   Lymphadenopathy:      Cervical: Cervical adenopathy present.      Left cervical: Superficial cervical adenopathy present.   Skin:     General: Skin is warm and dry.      Capillary Refill: Capillary refill takes less than 2 seconds.      Findings: Rash present.   Neurological:      General: No focal deficit present.      Mental Status: She is alert and oriented to person, place, and time.   Psychiatric:         Mood and Affect: Mood normal.         Behavior: Behavior normal.         Thought Content: Thought content normal.         Judgment: Judgment normal.         Labs Reviewed:     Chemistry:  Lab Results   Component Value Date     08/31/2023    K 4.2 08/31/2023    CHLORIDE 105 08/31/2023    BUN 10.9 08/31/2023    CREATININE 0.75 08/31/2023    EGFRNORACEVR >60 08/31/2023    GLUCOSE 93 08/31/2023    CALCIUM 8.8 08/31/2023    ALKPHOS 73 08/31/2023    LABPROT 7.0 08/31/2023    ALBUMIN 3.9 08/31/2023    BILIDIR 0.2 05/18/2021    IBILI 0.40 05/18/2021    AST 26 08/31/2023    ALT 16 08/31/2023    MG 1.8 08/16/2017    PHOS 3.4 08/16/2017    KEHOKMPO10DD 48.8 08/31/2023    TSH 1.201 08/31/2023        Lab Results   Component Value Date    HGBA1C 5.3 08/31/2023         Hematology:  Lab Results   Component Value Date    WBC 6.12 08/31/2023    HGB 12.6 08/31/2023    HCT 39.2 08/31/2023     08/31/2023       Lipid Panel:  Lab Results   Component Value Date    CHOL 170 08/31/2023    HDL 51 08/31/2023    LDL 97.00 08/31/2023    TRIG 110 08/31/2023    TOTALCHOLEST 3 08/31/2023        Urine:  Lab Results   Component Value Date    COLORUA Yellow 08/31/2023    APPEARANCEUA Clear 08/31/2023    SGUA 1.005 08/31/2023    PHUA 7.5 08/31/2023    PROTEINUA Negative 08/31/2023    GLUCOSEUA Negative 08/31/2023    KETONESUA Negative 08/31/2023    BLOODUA Negative 08/31/2023    NITRITESUA Negative 08/31/2023    LEUKOCYTESUR Negative 08/31/2023    RBCUA <5 08/31/2023    WBCUA <5 08/31/2023    BACTERIA None Seen 08/31/2023        Assessment:       ICD-10-CM ICD-9-CM   1. Cough, unspecified type  R05.9 786.2   2. Upper respiratory tract infection, unspecified type  J06.9 465.9   3. Stress  F43.9 V62.89        Plan:     1. Cough, unspecified type             Levocetirizine (XYZAL) 5 MG tablet; Take 1 tablet (5 mg total) by mouth every evening.          Drink plenty of water to thin secretions.   Fluticasone propionate 50 mcg; 1 spray each nostril daily. Has medication at home.  Use rescue inhaler as needed.   Avoid individuals who are ill.   Use humidifier to moisten air.  Wash hands thoroughly.  Wear a mask.  Nasal washings daily as discussed; as needed.   Drink plenty of fluids to thin secretions.   Go to the emergency department if symptoms of chest pain/tightness, shortness of breath, fever/chills, temp >100.4 or any acute illness.     2. Upper respiratory tract infection, unspecified type  doxycycline (VIBRAMYCIN) 100 MG Cap; Take 1 capsule (100 mg total) by mouth 2 (two) times daily.   Take antibiotic until complete. Take probiotic daily while taking antibiotic.    Drink plenty of water to thin secretions.   Fluticasone propionate 50 mcg; 1 spray each nostril daily. Has medication at  home.  Use rescue inhaler as needed.   Avoid individuals who are ill.   Use humidifier to moisten air.  Wash hands thoroughly.  Wear a mask.  Nasal washings daily as discussed; as needed.   Drink plenty of fluids to thin secretions.     3.  Stress   Contact office with the name of therapist for  referral.   Denies SI/HI, AH/VH.    Recommend relaxation techniques and coping strategies (i.e. essential oils, deep breathing, exercise, etc).   Practice positive phrases and repeat throughout the day, yoga, lavender scents or Chamomile tea will help anxiety.  Set healthy boundaries, avoid people and conversations that increase stress.    Practice deep breathing or abdominal breathing exercises when stressors occurs.  Exercise daily. Get sunlight daily.  Avoid caffeine, alcohol and stimulants.  Seek emergency medical treatment for SOB, persistent panic attack, chest pain, suicidal thoughts or hallucinations.     Follow up if symptoms worsen or fail to improve. In addition to their scheduled follow up, the patient has also been instructed to follow up on as needed basis.     Future Appointments   Date Time Provider Department Center   7/22/2024  8:30 AM [facility] MAMMO1 SCR1 [facility] BECKY Alexandre Br   7/22/2024  8:45 AM [facility] US1 [facility] US Alexandre Br   9/4/2024  8:00 AM Akilah Olivera MD [facility] HERMAN Schroeder    2/3/2025 11:40 AM aKtt Acuña MD [facility] BS HARRISON Farah

## 2024-07-22 ENCOUNTER — HOSPITAL ENCOUNTER (OUTPATIENT)
Dept: RADIOLOGY | Facility: HOSPITAL | Age: 39
Discharge: HOME | End: 2024-07-22
Attending: STUDENT IN AN ORGANIZED HEALTH CARE EDUCATION/TRAINING PROGRAM
Payer: COMMERCIAL

## 2024-07-22 VITALS — WEIGHT: 180 LBS | BODY MASS INDEX: 35.34 KG/M2 | HEIGHT: 60 IN

## 2024-07-22 DIAGNOSIS — Z12.39 OTHER SCREENING BREAST EXAMINATION: ICD-10-CM

## 2024-07-22 DIAGNOSIS — Z12.31 SCREENING MAMMOGRAM FOR HIGH-RISK PATIENT: ICD-10-CM

## 2024-07-22 PROCEDURE — 76641 ULTRASOUND BREAST COMPLETE: CPT | Mod: 26,,, | Performed by: RADIOLOGY

## 2024-07-22 PROCEDURE — 77063 BREAST TOMOSYNTHESIS BI: CPT | Mod: 26,,, | Performed by: RADIOLOGY

## 2024-07-22 PROCEDURE — 77067 SCR MAMMO BI INCL CAD: CPT | Mod: TC

## 2024-07-22 PROCEDURE — 76641 ULTRASOUND BREAST COMPLETE: CPT | Mod: TC,50

## 2024-07-22 PROCEDURE — 77063 BREAST TOMOSYNTHESIS BI: CPT | Mod: TC

## 2024-07-22 PROCEDURE — 77067 SCR MAMMO BI INCL CAD: CPT | Mod: 26,,, | Performed by: RADIOLOGY

## 2024-08-29 ENCOUNTER — TELEPHONE (OUTPATIENT)
Dept: FAMILY MEDICINE | Facility: CLINIC | Age: 39
End: 2024-08-29
Payer: COMMERCIAL

## 2024-08-29 DIAGNOSIS — Z00.00 LABORATORY EXAMINATION ORDERED AS PART OF A ROUTINE GENERAL MEDICAL EXAMINATION: Primary | ICD-10-CM

## 2024-09-03 ENCOUNTER — LAB VISIT (OUTPATIENT)
Dept: LAB | Facility: HOSPITAL | Age: 39
End: 2024-09-03
Attending: FAMILY MEDICINE
Payer: COMMERCIAL

## 2024-09-03 DIAGNOSIS — R82.71 BACTERIA IN URINE: ICD-10-CM

## 2024-09-03 DIAGNOSIS — D64.9 ANEMIA, UNSPECIFIED TYPE: ICD-10-CM

## 2024-09-03 DIAGNOSIS — Z00.00 LABORATORY EXAMINATION ORDERED AS PART OF A ROUTINE GENERAL MEDICAL EXAMINATION: ICD-10-CM

## 2024-09-03 LAB
25(OH)D3+25(OH)D2 SERPL-MCNC: 38 NG/ML (ref 30–80)
ALBUMIN SERPL-MCNC: 3.6 G/DL (ref 3.5–5)
ALBUMIN/GLOB SERPL: 1.2 RATIO (ref 1.1–2)
ALP SERPL-CCNC: 78 UNIT/L (ref 40–150)
ALT SERPL-CCNC: 15 UNIT/L (ref 0–55)
ANION GAP SERPL CALC-SCNC: 7 MEQ/L
AST SERPL-CCNC: 25 UNIT/L (ref 5–34)
BACTERIA #/AREA URNS AUTO: ABNORMAL /HPF
BASOPHILS # BLD AUTO: 0.05 X10(3)/MCL
BASOPHILS NFR BLD AUTO: 0.8 %
BILIRUB SERPL-MCNC: 0.5 MG/DL
BILIRUB UR QL STRIP.AUTO: NEGATIVE
BUN SERPL-MCNC: 12.1 MG/DL (ref 7–18.7)
CALCIUM SERPL-MCNC: 8.9 MG/DL (ref 8.4–10.2)
CHLORIDE SERPL-SCNC: 105 MMOL/L (ref 98–107)
CHOLEST SERPL-MCNC: 152 MG/DL
CHOLEST/HDLC SERPL: 4 {RATIO} (ref 0–5)
CLARITY UR: CLEAR
CO2 SERPL-SCNC: 26 MMOL/L (ref 22–29)
COLOR UR AUTO: ABNORMAL
CREAT SERPL-MCNC: 0.83 MG/DL (ref 0.55–1.02)
CREAT/UREA NIT SERPL: 15
EOSINOPHIL # BLD AUTO: 0.1 X10(3)/MCL (ref 0–0.9)
EOSINOPHIL NFR BLD AUTO: 1.6 %
ERYTHROCYTE [DISTWIDTH] IN BLOOD BY AUTOMATED COUNT: 13.9 % (ref 11.5–17)
EST. AVERAGE GLUCOSE BLD GHB EST-MCNC: 108.3 MG/DL
GFR SERPLBLD CREATININE-BSD FMLA CKD-EPI: >60 ML/MIN/1.73/M2
GLOBULIN SER-MCNC: 2.9 GM/DL (ref 2.4–3.5)
GLUCOSE SERPL-MCNC: 96 MG/DL (ref 74–100)
GLUCOSE UR QL STRIP: NORMAL
HBA1C MFR BLD: 5.4 %
HCT VFR BLD AUTO: 36.3 % (ref 37–47)
HDLC SERPL-MCNC: 42 MG/DL (ref 35–60)
HGB BLD-MCNC: 11.7 G/DL (ref 12–16)
HGB UR QL STRIP: NEGATIVE
IMM GRANULOCYTES # BLD AUTO: 0.02 X10(3)/MCL (ref 0–0.04)
IMM GRANULOCYTES NFR BLD AUTO: 0.3 %
KETONES UR QL STRIP: NEGATIVE
LDLC SERPL CALC-MCNC: 85 MG/DL (ref 50–140)
LEUKOCYTE ESTERASE UR QL STRIP: NEGATIVE
LYMPHOCYTES # BLD AUTO: 1.75 X10(3)/MCL (ref 0.6–4.6)
LYMPHOCYTES NFR BLD AUTO: 27.5 %
MCH RBC QN AUTO: 26.1 PG (ref 27–31)
MCHC RBC AUTO-ENTMCNC: 32.2 G/DL (ref 33–36)
MCV RBC AUTO: 81 FL (ref 80–94)
MONOCYTES # BLD AUTO: 0.56 X10(3)/MCL (ref 0.1–1.3)
MONOCYTES NFR BLD AUTO: 8.8 %
MUCOUS THREADS URNS QL MICRO: ABNORMAL /LPF
NEUTROPHILS # BLD AUTO: 3.88 X10(3)/MCL (ref 2.1–9.2)
NEUTROPHILS NFR BLD AUTO: 61 %
NITRITE UR QL STRIP: NEGATIVE
NRBC BLD AUTO-RTO: 0 %
PH UR STRIP: 5 [PH]
PLATELET # BLD AUTO: 308 X10(3)/MCL (ref 130–400)
PMV BLD AUTO: 11.6 FL (ref 7.4–10.4)
POTASSIUM SERPL-SCNC: 3.7 MMOL/L (ref 3.5–5.1)
PROT SERPL-MCNC: 6.5 GM/DL (ref 6.4–8.3)
PROT UR QL STRIP: NEGATIVE
RBC # BLD AUTO: 4.48 X10(6)/MCL (ref 4.2–5.4)
RBC #/AREA URNS AUTO: ABNORMAL /HPF
SODIUM SERPL-SCNC: 138 MMOL/L (ref 136–145)
SP GR UR STRIP.AUTO: 1.01 (ref 1–1.03)
SQUAMOUS #/AREA URNS LPF: ABNORMAL /HPF
TRIGL SERPL-MCNC: 126 MG/DL (ref 37–140)
TSH SERPL-ACNC: 1.02 UIU/ML (ref 0.35–4.94)
UROBILINOGEN UR STRIP-ACNC: NORMAL
VLDLC SERPL CALC-MCNC: 25 MG/DL
WBC # BLD AUTO: 6.36 X10(3)/MCL (ref 4.5–11.5)
WBC #/AREA URNS AUTO: ABNORMAL /HPF

## 2024-09-03 PROCEDURE — 83036 HEMOGLOBIN GLYCOSYLATED A1C: CPT

## 2024-09-03 PROCEDURE — 83540 ASSAY OF IRON: CPT

## 2024-09-03 PROCEDURE — 82306 VITAMIN D 25 HYDROXY: CPT

## 2024-09-03 PROCEDURE — 87086 URINE CULTURE/COLONY COUNT: CPT

## 2024-09-03 PROCEDURE — 80053 COMPREHEN METABOLIC PANEL: CPT

## 2024-09-03 PROCEDURE — 80061 LIPID PANEL: CPT

## 2024-09-03 PROCEDURE — 85025 COMPLETE CBC W/AUTO DIFF WBC: CPT

## 2024-09-03 PROCEDURE — 84443 ASSAY THYROID STIM HORMONE: CPT

## 2024-09-03 PROCEDURE — 83550 IRON BINDING TEST: CPT

## 2024-09-03 PROCEDURE — 81015 MICROSCOPIC EXAM OF URINE: CPT

## 2024-09-03 PROCEDURE — 36415 COLL VENOUS BLD VENIPUNCTURE: CPT

## 2024-09-03 NOTE — PROGRESS NOTES
Blood work reviewed  Patient has a drop in hemoglobin as compared to last year  She has an upcoming appointment on 09/04/2024 with her PCP.  PCP we will address all the blood work concerns in detail    She also has trace bacteria in urine   Please find out if she asymptomatic, so that we can send antibiotics   If not then we will continue to monitor

## 2024-09-04 ENCOUNTER — OFFICE VISIT (OUTPATIENT)
Dept: FAMILY MEDICINE | Facility: CLINIC | Age: 39
End: 2024-09-04
Payer: COMMERCIAL

## 2024-09-04 ENCOUNTER — TELEPHONE (OUTPATIENT)
Dept: FAMILY MEDICINE | Facility: CLINIC | Age: 39
End: 2024-09-04

## 2024-09-04 VITALS
RESPIRATION RATE: 16 BRPM | BODY MASS INDEX: 40.05 KG/M2 | HEART RATE: 81 BPM | WEIGHT: 204 LBS | HEIGHT: 60 IN | OXYGEN SATURATION: 99 % | DIASTOLIC BLOOD PRESSURE: 83 MMHG | SYSTOLIC BLOOD PRESSURE: 118 MMHG

## 2024-09-04 DIAGNOSIS — E66.09 CLASS 2 OBESITY DUE TO EXCESS CALORIES WITHOUT SERIOUS COMORBIDITY WITH BODY MASS INDEX (BMI) OF 39.0 TO 39.9 IN ADULT: ICD-10-CM

## 2024-09-04 DIAGNOSIS — M54.12 CERVICAL RADICULOPATHY: ICD-10-CM

## 2024-09-04 DIAGNOSIS — D25.9 UTERINE LEIOMYOMA, UNSPECIFIED LOCATION: ICD-10-CM

## 2024-09-04 DIAGNOSIS — D64.9 ANEMIA, UNSPECIFIED TYPE: ICD-10-CM

## 2024-09-04 DIAGNOSIS — R82.71 BACTERIA IN URINE: ICD-10-CM

## 2024-09-04 DIAGNOSIS — Z00.00 WELLNESS EXAMINATION: Primary | ICD-10-CM

## 2024-09-04 DIAGNOSIS — G43.009 MIGRAINE WITHOUT AURA AND WITHOUT STATUS MIGRAINOSUS, NOT INTRACTABLE: ICD-10-CM

## 2024-09-04 LAB
IRON SATN MFR SERPL: 28 % (ref 20–50)
IRON SERPL-MCNC: 72 UG/DL (ref 50–170)
TIBC SERPL-MCNC: 181 UG/DL (ref 70–310)
TIBC SERPL-MCNC: 253 UG/DL (ref 250–450)
TRANSFERRIN SERPL-MCNC: 232 MG/DL (ref 180–382)

## 2024-09-04 PROCEDURE — 3044F HG A1C LEVEL LT 7.0%: CPT | Mod: CPTII,,, | Performed by: FAMILY MEDICINE

## 2024-09-04 PROCEDURE — 1160F RVW MEDS BY RX/DR IN RCRD: CPT | Mod: CPTII,,, | Performed by: FAMILY MEDICINE

## 2024-09-04 PROCEDURE — 99213 OFFICE O/P EST LOW 20 MIN: CPT | Mod: 25,,, | Performed by: FAMILY MEDICINE

## 2024-09-04 PROCEDURE — 3074F SYST BP LT 130 MM HG: CPT | Mod: CPTII,,, | Performed by: FAMILY MEDICINE

## 2024-09-04 PROCEDURE — 99395 PREV VISIT EST AGE 18-39: CPT | Mod: ,,, | Performed by: FAMILY MEDICINE

## 2024-09-04 PROCEDURE — 3079F DIAST BP 80-89 MM HG: CPT | Mod: CPTII,,, | Performed by: FAMILY MEDICINE

## 2024-09-04 PROCEDURE — 3008F BODY MASS INDEX DOCD: CPT | Mod: CPTII,,, | Performed by: FAMILY MEDICINE

## 2024-09-04 PROCEDURE — 1159F MED LIST DOCD IN RCRD: CPT | Mod: CPTII,,, | Performed by: FAMILY MEDICINE

## 2024-09-04 RX ORDER — RIMEGEPANT SULFATE 75 MG/75MG
75 TABLET, ORALLY DISINTEGRATING ORAL
Qty: 10 TABLET | Refills: 2 | Status: SHIPPED | OUTPATIENT
Start: 2024-09-04

## 2024-09-04 NOTE — TELEPHONE ENCOUNTER
----- Message from Akilah Olivera MD sent at 9/4/2024  9:24 AM CDT -----  Iron panel shows that her iron level is on the low end of normal, needs to follow an iron rich diet, recheck CBC and iron panel in 09/2025.

## 2024-09-04 NOTE — PROGRESS NOTES
Patient ID: [id number]     Chief Complaint: Annual Exam        HPI:     Leta Mccarthy is a 39 y.o. female here today for annual wellness exam.  Well Adult History   -Patient presents for well adult exam, The patient's general health status is described as good. The patient's diet is described as balanced. Exercise: routine. Associated symptoms denies headache, denies vision changes, denies fatigue, denies fatigue and denies hearing loss. Last menstrual period: 08/21/2024, regular, heavier, has uterine fibroids, she is due for annual US pelvis, she would like female hormone panel done. Additional pertinent history: last dental exam: goes every 6 months, last eye exam: > 1 year ago (Doesn't wear corrective lens, she has vision insurance, she will schedule an appt. next available), last pap smear : 10/05/2023 (WNL with GYN (Dr. Ramirez)), seat belt use, occasional caffeine use (coffee), tobacco use none, alcohol use social and She had labs done on 09/03/2024, here to discuss the results. She refuses COVID-19 booster and flu vaccines, but she is UTD on all other vaccines. She has a family history of breast cancer, was seen by Dr. 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 Sommers), had NL Colonoscopy in 08/2023, will do Colonoscopies every 5 years and had MRI breast scheduled for the end of this year, followed by high risk breast clinic with Dr. Katt Acuña.   - She is obese, she has gained 24 pounds since last visit, she would not like to see a dietician, she is not interested in weight loss surgery, she is not interested in weight loss Rx.   -The patient presents with right neck and right upper extremity pain and low back pain radiating down right lower extremity. The location of pain is the right neck and right shoulder(s) and low back pain radiating down right lower extremity. The pain is characterized by aching, throbbing, feels like something is pulling in her neck, worse at  night when she is trying to sleep, having migraine headaches with sensitivity to light and sound, reports nausea, no vomiting. The severity of the pain is rated 6-7 / 10 on the pain scale. The pain occurred > 1 year ago). The context of the pain: occurred was sleeping and denies injury. Exacerbating factors consist of sleeping. Relieving factors consist of none. Associated symptoms consist of denies decreased range of motion, denies joint instability, denies joint stiffness, denies loss of function, denies numbness and denies tingling. Prior treatment consists of none. She has tried massages, OTC ointment, OTC Tylenol/Motrin, Imitrex, Voltaren Gel with some resolution of symptoms. She had MRI C-spine and L-spine that showed DJD, and she has tried PT in the past without success, she is not interested in pain management or Neurosurgery referral at this time. She denies heavy snoring and witnessed apnea, she is not interested in sleep study.   - Patient is without any other complaints today.      Advance Care Planning     Date: 2024  Patient did not wish or was not able to name a surrogate decision maker or provide an Advance Care Plan.        -------------------------------------    Cervical radiculopathy    Migraine without aura and responsive to treatment    Obesity    Vitamin D deficiency        Past Surgical History:   Procedure Laterality Date     SECTION       SECTION      HERNIA REPAIR      OOPHORECTOMY      TUBAL LIGATION         Review of patient's allergies indicates:  No Known Allergies    Outpatient Medications Marked as Taking for the 24 encounter (Office Visit) with Akilah Olivera MD   Medication Sig Dispense Refill    ascorbic acid, vitamin C, (VITAMIN C) 500 MG tablet Take 500 mg by mouth once daily.      cholecalciferol, vitamin D3, (VITAMIN D3) 50 mcg (2,000 unit) Cap capsule Take 2,000 Int'l Units by mouth once daily.      diclofenac sodium (VOLTAREN) 1 % Gel  Apply 2 g topically 4 (four) times daily as needed (pain/inflammation). 100 g 1    magnesium 30 mg Tab Take by mouth nightly as needed.      multivitamin with folic acid 400 mcg Tab Take 1 tablet by mouth once daily.      omega-3 fatty acids/fish oil (FISH OIL-OMEGA-3 FATTY ACIDS) 300-1,000 mg capsule Take 2 g by mouth once daily.         Social History     Socioeconomic History    Marital status: Single   Tobacco Use    Smoking status: Never    Smokeless tobacco: Never   Substance and Sexual Activity    Alcohol use: Not Currently    Drug use: Never    Sexual activity: Yes     Partners: Male     Birth control/protection: None     Social Determinants of Health     Financial Resource Strain: Low Risk  (8/31/2023)    Overall Financial Resource Strain (CARDIA)     Difficulty of Paying Living Expenses: Not hard at all   Food Insecurity: No Food Insecurity (8/31/2023)    Hunger Vital Sign     Worried About Running Out of Food in the Last Year: Never true     Ran Out of Food in the Last Year: Never true   Transportation Needs: No Transportation Needs (8/31/2023)    PRAPARE - Transportation     Lack of Transportation (Medical): No     Lack of Transportation (Non-Medical): No   Physical Activity: Sufficiently Active (8/31/2023)    Exercise Vital Sign     Days of Exercise per Week: 4 days     Minutes of Exercise per Session: 60 min   Housing Stability: Low Risk  (8/31/2023)    Housing Stability Vital Sign     Unable to Pay for Housing in the Last Year: No     Number of Places Lived in the Last Year: 1     Unstable Housing in the Last Year: No        Family History   Problem Relation Name Age of Onset    Hypertension Mother Mom     Osteoarthritis Mother Mom     Vaginal cancer Mother Mom 61    Arthritis Mother Mom     Cancer Mother Mom     Breast cancer Sister pat 1/2 sister 44    Hypertension Brother Brother     Heart disease Brother Brother     Hypertension Brother Bro     Breast cancer Maternal Aunt  45    Lung cancer  Maternal Aunt      Ovarian cancer Maternal Aunt      Cancer Maternal Aunt Aunt         Subjective:       Review of Systems:    See HPI for details    Constitutional: Denies Change in appetite. Denies Chills. Denies Fever. Denies Night sweats.  Eye: Denies Blurred vision. Denies Discharge. Denies Eye pain.  ENT: Denies Decreased hearing. Denies Sore throat. Denies Swollen glands.  Respiratory: Denies Cough. Denies Shortness of breath. Denies Shortness of breath with exertion. Denies Wheezing.  Cardiovascular: Denies Chest pain at rest. Denies Chest pain with exertion. Denies Irregular heartbeat. Denies Palpitations.  Gastrointestinal: Denies Abdominal pain. Denies Diarrhea. Denies Nausea. Denies Vomiting. Denies Hematemesis or Hematochezia.  Genitourinary: Denies Dysuria. Denies Urinary frequency. Denies Urinary urgency. Denies Blood in urine.  Endocrine: Denies Cold intolerance. Denies Excessive thirst. Denies Heat intolerance. Denies Weight loss. Denies Weight gain.  Musculoskeletal: Denies Painful joints. Denies Weakness.  Integumentary: Denies Rash. Denies Itching. Denies Dry skin.  Neurologic: Denies Dizziness. Denies Fainting. Reports Headache.  Psychiatric: Denies Depression. Denies Anxiety. Denies Suicidal/Homicidal ideations.    All Other ROS: Negative except as stated in HPI.       Objective:     /83 (BP Location: Right arm, Patient Position: Sitting, BP Method: Large (Automatic))   Pulse 81   Resp 16   Ht 5' (1.524 m)   Wt 92.5 kg (204 lb)   LMP 08/21/2024 (Exact Date)   SpO2 99%   BMI 39.84 kg/m²     Physical Exam    General: Alert and oriented, No acute distress. Obese.   Head: Normocephalic, Atraumatic.  Eye: Pupils are equal, round and reactive to light, Extraocular movements are intact, Sclera non-icteric.  Ears/Nose/Throat: Normal, Mucosa moist,Clear.  Neck/Thyroid: Supple, Non-tender, No carotid bruit, No palpable thyromegaly or thyroid nodule, No lymphadenopathy, No JVD, Full range of  motion.  Respiratory: Clear to auscultation bilaterally; No wheezes, rales or rhonchi,Non-labored respirations, Symmetrical chest wall expansion.  Cardiovascular: Regular rate and rhythm, S1/S2 normal, No murmurs, rubs or gallops.  Gastrointestinal: Soft, Non-tender, Non-distended, Normal bowel sounds, No palpable organomegaly.  Musculoskeletal: Normal range of motion.  Integumentary: Warm, Dry, Intact, No suspicious lesions or rashes.  Extremities: No clubbing, cyanosis or edema  Neurologic: No focal deficits, Cranial Nerves II-XII are grossly intact, Motor strength normal upper and lower extremities, Sensory exam intact.  Psychiatric: Normal interaction, Coherent speech, Euthymic mood, Appropriate affect     *Lab results from 09/03/2024 were reviewed and discussed with patient and patient voices understanding.*      Assessment:       ICD-10-CM ICD-9-CM   1. Wellness examination  Z00.00 V70.0   2. Uterine leiomyoma, unspecified location  D25.9 218.9   3. Anemia, unspecified type  D64.9 285.9   4. Bacteria in urine  R82.71 791.9   5. Class 2 obesity due to excess calories without serious comorbidity with body mass index (BMI) of 39.0 to 39.9 in adult  E66.09 278.00    Z68.39 V85.39   6. Cervical radiculopathy  M54.12 723.4   7. Migraine without aura and without status migrainosus, not intractable  G43.009 346.10        Plan:     Problem List Items Addressed This Visit          Neuro    Cervical radiculopathy (Chronic)       Endocrine    Obesity (Chronic)     Other Visit Diagnoses       Wellness examination    -  Primary    Relevant Orders    Follicle Stimulating Hormone    Luteinizing Hormone    Testosterone    DHEA-Sulfate    Estrogens, fractionated    17-Hydroxyprogesterone    Uterine leiomyoma, unspecified location        Relevant Orders    Follicle Stimulating Hormone    Luteinizing Hormone    Testosterone    DHEA-Sulfate    Estrogens, fractionated    17-Hydroxyprogesterone    US Transvaginal Non OB    Anemia,  unspecified type        Relevant Orders    Iron and TIBC    Bacteria in urine        Relevant Orders    Urine Culture High Risk    Migraine without aura and without status migrainosus, not intractable        Relevant Medications    rimegepant (NURTEC) 75 mg odt         1. Wellness examination  - Follicle Stimulating Hormone; Future  - Luteinizing Hormone; Future  - Testosterone; Future  - DHEA-Sulfate; Future  - Estrogens, fractionated; Future  - 17-Hydroxyprogesterone; Future  - Will treat pending lab results. Monthly breast self exam encouraged. Diet, exercise, and 10% weight loss encouraged. Keep appointment for dental exams x q6 months as scheduled. Keep appointment for annual eye exam as scheduled. Keep appointment with GYN for annual pap smear as scheduled. Keep appointment with specialists as scheduled. Notify M.D. or ER if temp greater than 100.4, or any acute illness.      2. Uterine leiomyoma, unspecified location  - Follicle Stimulating Hormone; Future  - Luteinizing Hormone; Future  - Testosterone; Future  - DHEA-Sulfate; Future  - Estrogens, fractionated; Future  - 17-Hydroxyprogesterone; Future  - US Transvaginal Non OB; Future  - Continue followup with GYN as scheduled.     3. Anemia, unspecified type  - Iron and TIBC; Future  - Continue iron rich diet, will treat pending results.     4. Bacteria in urine  - Urine Culture High Risk; Future  - Will treat pending results.     5. Class 2 obesity due to excess calories without serious comorbidity with body mass index (BMI) of 39.0 to 39.9 in adult  Body mass index is 39.84 kg/m².  Goal BMI <30.  Exercise 5 times a week for 30 minutes per day.  Avoid soda, simple sugars, excessive rice, potatoes or bread. Limit fast foods and fried foods.  Choose complex carbs in moderation (example: green vegetables, beans, oatmeal). Eat plenty of fresh fruits and vegetables with lean meats daily.  Do not skip meals. Eat a balanced portion size.  Avoid fad diets. Consider  permanent healthy life style changes.      6. Cervical radiculopathy  - Continue stretching exercises, patient to consider referral to pain management for evaluation for NIRALI.     7. Migraine without aura and without status migrainosus, not intractable  - Rx trial of rimegepant (NURTEC) 75 mg odt; Take 1 tablet (75 mg total) by mouth every 24 hours as needed for Migraine. Place ODT tablet on the tongue; alternatively the ODT tablet may be placed under the tongue  Dispense: 10 tablet; Refill: 2  - Keep headache diary, if no improvement, will proceed with CT head and referral to Neurology for evaluation and treatment. Notify M.D. or ER if symptoms persist or worsen, refractory headache, vomiting, visual changes, temp >100.4, or any acute illness.          Leta was seen today for annual exam.    Diagnoses and all orders for this visit:    Wellness examination  -     Follicle Stimulating Hormone; Future  -     Luteinizing Hormone; Future  -     Testosterone; Future  -     DHEA-Sulfate; Future  -     Estrogens, fractionated; Future  -     17-Hydroxyprogesterone; Future    Uterine leiomyoma, unspecified location  -     Cancel: US Transvaginal Non OB; Future  -     Follicle Stimulating Hormone; Future  -     Luteinizing Hormone; Future  -     Testosterone; Future  -     DHEA-Sulfate; Future  -     Estrogens, fractionated; Future  -     17-Hydroxyprogesterone; Future  -     US Transvaginal Non OB; Future    Anemia, unspecified type  -     Iron and TIBC; Future    Bacteria in urine  -     Urine Culture High Risk; Future    Class 2 obesity due to excess calories without serious comorbidity with body mass index (BMI) of 39.0 to 39.9 in adult    Cervical radiculopathy    Migraine without aura and without status migrainosus, not intractable  -     rimegepant (NURTEC) 75 mg odt; Take 1 tablet (75 mg total) by mouth every 24 hours as needed for Migraine. Place ODT tablet on the tongue; alternatively the ODT tablet may be placed  under the tongue          Medication List with Changes/Refills   New Medications    RIMEGEPANT (NURTEC) 75 MG ODT    Take 1 tablet (75 mg total) by mouth every 24 hours as needed for Migraine. Place ODT tablet on the tongue; alternatively the ODT tablet may be placed under the tongue       Start Date: 9/4/2024  End Date: --   Current Medications    ASCORBIC ACID, VITAMIN C, (VITAMIN C) 500 MG TABLET    Take 500 mg by mouth once daily.       Start Date: --        End Date: --    CHOLECALCIFEROL, VITAMIN D3, (VITAMIN D3) 50 MCG (2,000 UNIT) CAP CAPSULE    Take 2,000 Int'l Units by mouth once daily.       Start Date: --        End Date: --    DICLOFENAC SODIUM (VOLTAREN) 1 % GEL    Apply 2 g topically 4 (four) times daily as needed (pain/inflammation).       Start Date: 9/8/2023  End Date: --    MAGNESIUM 30 MG TAB    Take by mouth nightly as needed.       Start Date: --        End Date: --    MULTIVITAMIN WITH FOLIC ACID 400 MCG TAB    Take 1 tablet by mouth once daily.       Start Date: --        End Date: --    OMEGA-3 FATTY ACIDS/FISH OIL (FISH OIL-OMEGA-3 FATTY ACIDS) 300-1,000 MG CAPSULE    Take 2 g by mouth once daily.       Start Date: --        End Date: --   Discontinued Medications    ALBUTEROL (VENTOLIN HFA) 90 MCG/ACTUATION INHALER    Inhale 2 puffs into the lungs every 6 (six) hours as needed for Wheezing. Rescue       Start Date: 2/1/2024  End Date: 9/4/2024    DOXYCYCLINE (VIBRAMYCIN) 100 MG CAP    Take 1 capsule (100 mg total) by mouth 2 (two) times daily.       Start Date: 4/30/2024 End Date: 9/4/2024    LEVOCETIRIZINE (XYZAL) 5 MG TABLET    Take 1 tablet (5 mg total) by mouth every evening.       Start Date: 4/30/2024 End Date: 9/4/2024    PROMETHAZINE-DEXTROMETHORPHAN (PROMETHAZINE-DM) 6.25-15 MG/5 ML SYRP    Take 5 mLs by mouth every 8 (eight) hours as needed (cough).       Start Date: 2/1/2024  End Date: 9/4/2024          No follow-ups on file.

## 2024-09-04 NOTE — PATIENT INSTRUCTIONS
Baldo Gillette,     If you are due for any health screening(s) below please notify me so we can arrange them to be ordered and scheduled. Most healthy patients at your age complete them, but you are free to accept or refuse.     If you can't do it, I'll definitely understand. If you can, I'd certainly appreciate it!    All of your core healthy metrics are met.

## 2024-09-05 ENCOUNTER — PATIENT MESSAGE (OUTPATIENT)
Dept: FAMILY MEDICINE | Facility: CLINIC | Age: 39
End: 2024-09-05
Payer: COMMERCIAL

## 2024-09-06 LAB — BACTERIA UR CULT: NO GROWTH

## 2024-09-13 ENCOUNTER — TELEPHONE (OUTPATIENT)
Dept: FAMILY MEDICINE | Facility: CLINIC | Age: 39
End: 2024-09-13
Payer: COMMERCIAL

## 2024-09-13 ENCOUNTER — LAB VISIT (OUTPATIENT)
Dept: LAB | Facility: HOSPITAL | Age: 39
End: 2024-09-13
Attending: FAMILY MEDICINE
Payer: COMMERCIAL

## 2024-09-13 DIAGNOSIS — Z00.00 WELLNESS EXAMINATION: ICD-10-CM

## 2024-09-13 DIAGNOSIS — D25.9 UTERINE LEIOMYOMA, UNSPECIFIED LOCATION: ICD-10-CM

## 2024-09-13 LAB
FSH SERPL-ACNC: 3.06 MIU/ML
LH SERPL-ACNC: 3.23 MIU/ML
TESTOST SERPL-MCNC: 24.08 NG/DL (ref 13.84–53.35)

## 2024-09-13 PROCEDURE — 36415 COLL VENOUS BLD VENIPUNCTURE: CPT

## 2024-09-13 PROCEDURE — 83498 ASY HYDROXYPROGESTERONE 17-D: CPT

## 2024-09-13 PROCEDURE — 84403 ASSAY OF TOTAL TESTOSTERONE: CPT

## 2024-09-13 PROCEDURE — 83002 ASSAY OF GONADOTROPIN (LH): CPT

## 2024-09-13 PROCEDURE — 83001 ASSAY OF GONADOTROPIN (FSH): CPT

## 2024-09-13 PROCEDURE — 82671 ASSAY OF ESTROGENS: CPT

## 2024-09-13 PROCEDURE — 82627 DEHYDROEPIANDROSTERONE: CPT

## 2024-09-13 NOTE — TELEPHONE ENCOUNTER
----- Message from Akilah Olivera MD sent at 9/13/2024 11:19 AM CDT -----  Ultrasound pelvis shows four small uterine fibroids measuring up to 2.4 cm in greatest dimension.  No additional or acute pelvic pathology identified. Please forward results to her GYN (Dr. Ramirez) for review/treatment recommendations.

## 2024-09-16 LAB — DHEA-S SERPL-MCNC: 74 MCG/DL (ref 45–295)

## 2024-09-17 ENCOUNTER — PATIENT MESSAGE (OUTPATIENT)
Dept: FAMILY MEDICINE | Facility: CLINIC | Age: 39
End: 2024-09-17
Payer: COMMERCIAL

## 2024-09-17 DIAGNOSIS — G43.009 MIGRAINE WITHOUT AURA AND WITHOUT STATUS MIGRAINOSUS, NOT INTRACTABLE: Primary | ICD-10-CM

## 2024-09-17 LAB
17OHP SERPL-MCNC: 128 NG/DL
ESTRADIOL SERPL-MCNC: 120 PG/ML
ESTRONE SERPL-MCNC: 103 PG/ML

## 2024-09-18 ENCOUNTER — PATIENT MESSAGE (OUTPATIENT)
Dept: FAMILY MEDICINE | Facility: CLINIC | Age: 39
End: 2024-09-18
Payer: COMMERCIAL

## 2024-09-18 RX ORDER — UBROGEPANT 50 MG/1
50 TABLET ORAL
Qty: 10 TABLET | Refills: 2 | Status: SHIPPED | OUTPATIENT
Start: 2024-09-18

## 2024-12-29 NOTE — TELEPHONE ENCOUNTER
----- Message from Solange Maharaj sent at 6/28/2022 12:15 PM CDT -----  Regarding: referral  .Type:  Needs Medical Advice    Who Called: [facility] specialist   Symptoms (please be specific): Na  How long has patient had these symptoms:  NA  Pharmacy name and phone #:  [facility]  Would the patient rather a call back or a response via [facility]? Call back  Best Call Back Number: [phone]  Additional Information: Want more info on referaal that was sent.         Refill Routing Note   Medication(s) are not appropriate for processing by [facility] for the following reason(s):        Required vitals abnormal  Required labs outdated  Outside of protocol    [facility] action(s):  Defer  Route             Appointments  past 12m or future 3m with PCP    Date Provider   Last Visit   12/12/2023 Alex Cosby MD   Next Visit   Visit date not found Alex Cosby MD   ED visits in past 90 days: 0        Note composed:11:58 AM 12/29/2024

## 2025-02-04 NOTE — PROGRESS NOTES
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     Interval History:  Leta Mccarthy is a 39 y.o.female who is here today for high risk FU. She is doing well in the interval. She is still getting breast tenderness around the time of her periods.   She otherwise denies any breast issues including rashes, redness, swelling, nipple discharge, or new lumps/masses. She had recent screening mammogram and ultrasound in July which resulted as benign. She still has regular menstrual cycles.  She currently denies any significant interval changes or any changes in family history.  She has no new questions or concerns today.    Of note, her mother is still battling stage IV cancer.     History of Present Illness:  Leta Mccarthy  is a pleasant female patient who initially presents on  2025 at 39 y.o. years old for evaluation and assessment of risk for breast cancer based on  the Tyrer - Cuzick Breast Cancer Risk Model (> 20% indicates elevated lifetime risk). Her lifetime risk is calculated to be 29.4%. Her 5 year Karma score is 0.5%.   History of breast cancer in a sister and maternal aunt.  She has two boys.     Imagin/10/2022 SCR MG at [facility]- Negative.    Bilateral screening mammogram 2023:  Density D.  No suspicious findings.  BI-RADS 1.  MRI Breast 2023: no suspicious findings. BIRADS 1.   BL SC MG and US 2024: No mammographic or sonographic  evidence of malignancy in either breast. BIRADS: 2 Benign     I have reviewed the imaging and agree with the radiologists interpretation. I have discussed these results with the patient.    Pathology:   none    OB / GYN History   Menarche Onset:13  Menopause: Menopause: premenopausal  Hormonal birth control (duration): 1 years  Pregnancies: 2  Age at first child birth: 30  Child births: 2  Hysterectomy/Oophorectomy: no- tubes were removed around age 34  HRT: no    Family History of Cancer (& age at diagnosis):  - Mother Vaginal Cancer  at age 60   Aunt maternal Breast at age 40 dunia   Aunt maternal Ovarian at age 60 dunia   Aunt maternal Lung cancer at age 60 dunia   Aunt maternal unknown at age unknown   Sister paternal Breast cancer  at age 45    Family History   Problem Relation Name Age of Onset    Hypertension Mother Mom     Osteoarthritis Mother Mom     Vaginal cancer Mother Mom 61    Arthritis Mother Mom     Cancer Mother Mom     Breast cancer Sister pat 1/2 sister 44    Hypertension Brother Brother     Heart disease Brother Brother     Hypertension Brother Bro     Breast cancer Maternal Aunt  45    Lung cancer Maternal Aunt      Ovarian cancer Maternal Aunt      Cancer Maternal Aunt Aunt         Lifestyle:  Height and Weight: 5 foot 171 lbs   BMI: Body mass index is 39.26 kg/m².     Other:  # of breast biopsies (when and pathology results): 0  MG breast density:  BIRADS D  Prior thoracic RT: none  Genetic testing: no  Ashkenazi Gnosticism descent: No    Other History:     Past Medical History:   Diagnosis Date    Cervical radiculopathy 2022    Migraine without aura and responsive to treatment 2022    Obesity 2022    Vitamin D deficiency 2022        Past Surgical History:   Procedure Laterality Date     SECTION       SECTION      HERNIA REPAIR      OOPHORECTOMY      TUBAL LIGATION          Social History     Socioeconomic History    Marital status: Single   Tobacco Use     Smoking status: Never    Smokeless tobacco: Never   Substance and Sexual Activity    Alcohol use: Not Currently    Drug use: Never    Sexual activity: Yes     Partners: Male     Birth control/protection: None     Social Drivers of Health     Financial Resource Strain: Low Risk  (8/31/2023)    Overall Financial Resource Strain (CARDIA)     Difficulty of Paying Living Expenses: Not hard at all   Food Insecurity: No Food Insecurity (8/31/2023)    Hunger Vital Sign     Worried About Running Out of Food in the Last Year: Never true     Ran Out of Food in the Last Year: Never true   Transportation Needs: No Transportation Needs (8/31/2023)    PRAPARE - Transportation     Lack of Transportation (Medical): No     Lack of Transportation (Non-Medical): No   Physical Activity: Sufficiently Active (8/31/2023)    Exercise Vital Sign     Days of Exercise per Week: 4 days     Minutes of Exercise per Session: 60 min   Housing Stability: Low Risk  (8/31/2023)    Housing Stability Vital Sign     Unable to Pay for Housing in the Last Year: No     Number of Places Lived in the Last Year: 1     Unstable Housing in the Last Year: No        Immunization History   Administered Date(s) Administered    COVID-19, MRNA, LN-S, PF (MODERNA FULL 0.5 ML DOSE) 11/20/2022, 12/18/2022    Influenza - Quadrivalent - PF *Preferred* (6 months and older) 09/04/2019    Tdap 07/06/2016        Medications/Allergies:       Current Outpatient Medications:     ascorbic acid, vitamin C, (VITAMIN C) 500 MG tablet, Take 500 mg by mouth once daily., Disp: , Rfl:     cholecalciferol, vitamin D3, (VITAMIN D3) 50 mcg (2,000 unit) Cap capsule, Take 2,000 Int'l Units by mouth once daily., Disp: , Rfl:     diclofenac sodium (VOLTAREN) 1 % Gel, Apply 2 g topically 4 (four) times daily as needed (pain/inflammation)., Disp: 100 g, Rfl: 1    magnesium 30 mg Tab, Take by mouth nightly as needed., Disp: , Rfl:     multivitamin with folic acid 400 mcg Tab, Take 1 tablet by  mouth once daily., Disp: , Rfl:     omega-3 fatty acids/fish oil (FISH OIL-OMEGA-3 FATTY ACIDS) 300-1,000 mg capsule, Take 2 g by mouth once daily., Disp: , Rfl:     ubrogepant (UBRELVY) 50 mg tablet, Take 1 tablet (50 mg total) by mouth as needed for Migraine. If symptoms persist or return, may repeat dose after 2 hours. Maximum: 200 mg per 24 hours, Disp: 10 tablet, Rfl: 2    Review of patient's allergies indicates:  No Known Allergies     Review of Systems:      Normal unless otherwise noted in HPI        Objective/Physical Exam     Vitals:    02/05/25 0820   BP: 128/85   Pulse: 77   Resp: 18   Temp: 98.4 °F (36.9 °C)         General: The patient is awake, alert and oriented times three. The patient is well nourished and in no acute distress.  Neck: There is no evidence of palpable cervical, supraclavicular or axillary adenopathy. The neck is supple. The thyroid is not enlarged.  Musculoskeletal: The patient has a normal range of motion of her bilateral upper extremities.  Chest: Examination of the chest wall fails to reveal any obvious abnormalities.  The lungs are clear to auscultation bilaterally without rales, rhonchi, or wheezing.  Cardiovascular: The heart has a regular rate and rhythm without murmurs, gallops or rubs.  Breast:   Right:  Examination of right breast fails to reveal any dominant masses or areas of significant focal nodularity. The nipple is everted without evidence of discharge. There is no skin dimpling with movement of the pectoralis. There is no significant skin changes overlying the breast.   Left:  Examination of the left breast fails to reveal any dominant masses or areas of significant focal nodularity. The nipple is everted without evidence of discharge. There is no skin dimpling with movement of the pectoralis. There are no significant skin changes overlying the breast.  Abdomen: The abdomen is soft, flat, nontender and nondistended with no palpable masses or  organomegaly.  Integumentary: no rashes or skin lesions present       Assessment and Plan     Patient Active Problem List   Diagnosis    Cervical radiculopathy    Vitamin D deficiency    Low back pain    Migraine without aura and responsive to treatment    Obesity    Night sweats    Tarlov cysts    Osteoarthritis of spine with radiculopathy, cervical region    Cough    Upper respiratory tract infection    Stress       Leta was seen today for follow-up.    Diagnoses and all orders for this visit:    At high risk for breast cancer  -     MRI Screening Breast W/WO Contrast, W/CAD, Patrick; Future  -     Mammo Digital Screening Bilat w/ Marcello; Future    Family history of breast cancer  -     MRI Screening Breast W/WO Contrast, W/CAD, Patrick; Future  -     Mammo Digital Screening Bilat w/ Marcello; Future    Breast cancer screening, high risk patient  -     MRI Screening Breast W/WO Contrast, W/CAD, Patrick; Future  -     Mammo Digital Screening Bilat w/ Marcello; Future    Screening mammogram for breast cancer  -     Mammo Digital Screening Bilat w/ Marcello; Future          Leta Mccarthy is a 39 y.o.female who is here today for high risk follow up.  There are no concerning findings on her breast exam or on recent imaging.  Today Leta Mccarthy was plugged into the [employer] risk calculator and her calculated risk of breast cancer based on Tyrer - Cuzick Breast Cancer Risk Model was 29%.  She understands that >20% is considered high risk.  Today we again discussed risk factors associated with the development of breast cancer and risk reduction strategies.       PLAN:     1. Lifestyle - Healthy lifestyle guidelines were reviewed. She was encouraged to engage in regular exercise, maintain a healthy body weight, and avoid excessive alcohol consumption. Healthy nutritional guidelines were also discussed.      2. Imaging:  She would like to continue with high-risk screening imaging but would like to do MRI every other year.  She  will be due for screening mammogram in July and she would like to undergo breast MRI in December 2025.  RTC in 1 year.    3. Other: Recommend 2 clinical breast exams a year. She sees her OBGYN in the Fall.  RTC in one year.      The patient was encouraged to continue her self breast exam. If she notes any changes or has any concerns with her breast in the interim she was encouraged to call the breast center to schedule an appointment as soon as possible. All of her questions were answered.       Myrna Garcia PA-C        Total time on the date of the visit ranged from 30-39 mins (36010). Total time includes both face-to-face and non-face-to-face time personally spent by myself on the day of the visit.    Non-face-to-face time included:  _X_ preparing to see the patient such as reviewing the patient record  __ obtaining and reviewing separately obtained history  _X_ independently interpreting results  _X_ documenting clinical information in electronic health record.    Face-to-face time included:  _X_ performing an appropriate history and examination  _X_ communicating results to the patient  _X_ counseling and educating the patient  __ ordering appropriate medications  __ ordering appropriate tests  _X_ ordering appropriate procedures (including follow-up)  _X_ answering any questions the patient had    Total Time spent on date of visit: 35 minutes

## 2025-02-05 ENCOUNTER — OFFICE VISIT (OUTPATIENT)
Dept: SURGERY | Facility: CLINIC | Age: 40
End: 2025-02-05
Payer: COMMERCIAL

## 2025-02-05 VITALS
TEMPERATURE: 98 F | SYSTOLIC BLOOD PRESSURE: 128 MMHG | RESPIRATION RATE: 18 BRPM | DIASTOLIC BLOOD PRESSURE: 85 MMHG | HEART RATE: 77 BPM | BODY MASS INDEX: 39.46 KG/M2 | OXYGEN SATURATION: 100 % | HEIGHT: 60 IN | WEIGHT: 201 LBS

## 2025-02-05 DIAGNOSIS — Z12.39 BREAST CANCER SCREENING, HIGH RISK PATIENT: ICD-10-CM

## 2025-02-05 DIAGNOSIS — Z91.89 AT HIGH RISK FOR BREAST CANCER: Primary | ICD-10-CM

## 2025-02-05 DIAGNOSIS — Z12.31 SCREENING MAMMOGRAM FOR BREAST CANCER: ICD-10-CM

## 2025-02-05 DIAGNOSIS — Z80.3 FAMILY HISTORY OF BREAST CANCER: ICD-10-CM

## 2025-02-05 PROCEDURE — 1160F RVW MEDS BY RX/DR IN RCRD: CPT | Mod: CPTII,S$GLB,,

## 2025-02-05 PROCEDURE — 99214 OFFICE O/P EST MOD 30 MIN: CPT | Mod: S$GLB,,,

## 2025-02-05 PROCEDURE — 1159F MED LIST DOCD IN RCRD: CPT | Mod: CPTII,S$GLB,,

## 2025-02-05 PROCEDURE — 3074F SYST BP LT 130 MM HG: CPT | Mod: CPTII,S$GLB,,

## 2025-02-05 PROCEDURE — 3008F BODY MASS INDEX DOCD: CPT | Mod: CPTII,S$GLB,,

## 2025-02-05 PROCEDURE — 3079F DIAST BP 80-89 MM HG: CPT | Mod: CPTII,S$GLB,,

## 2025-02-05 PROCEDURE — 99999 PR PBB SHADOW E&M-EST. PATIENT-LVL IV: CPT | Mod: PBBFAC,,,

## 2025-02-28 ENCOUNTER — OFFICE VISIT (OUTPATIENT)
Dept: FAMILY MEDICINE | Facility: CLINIC | Age: 40
End: 2025-02-28
Payer: COMMERCIAL

## 2025-02-28 VITALS
TEMPERATURE: 98 F | WEIGHT: 201 LBS | RESPIRATION RATE: 16 BRPM | SYSTOLIC BLOOD PRESSURE: 119 MMHG | BODY MASS INDEX: 39.46 KG/M2 | DIASTOLIC BLOOD PRESSURE: 84 MMHG | OXYGEN SATURATION: 95 % | HEIGHT: 60 IN | HEART RATE: 76 BPM

## 2025-02-28 DIAGNOSIS — R07.9 CHEST PAIN, UNSPECIFIED TYPE: ICD-10-CM

## 2025-02-28 DIAGNOSIS — D64.9 ANEMIA, UNSPECIFIED TYPE: ICD-10-CM

## 2025-02-28 DIAGNOSIS — G89.29 CHRONIC PAIN OF MULTIPLE JOINTS: ICD-10-CM

## 2025-02-28 DIAGNOSIS — M47.26 OSTEOARTHRITIS OF SPINE WITH RADICULOPATHY, LUMBAR REGION: ICD-10-CM

## 2025-02-28 DIAGNOSIS — M47.22 OSTEOARTHRITIS OF SPINE WITH RADICULOPATHY, CERVICAL REGION: Primary | ICD-10-CM

## 2025-02-28 DIAGNOSIS — M25.50 CHRONIC PAIN OF MULTIPLE JOINTS: ICD-10-CM

## 2025-02-28 DIAGNOSIS — M54.9 DORSALGIA, UNSPECIFIED: ICD-10-CM

## 2025-02-28 NOTE — PROGRESS NOTES
Patient ID: [id number]     Chief Complaint: Follow-up (Right side of body reoccuring body pain)        HPI:     Leta Mccarthy is a 40 y.o. female here today for a follow up right sided body pain.  -The patient presents with right neck and right upper extremity pain and low back pain radiating down right lower extremity. The location of pain is the right neck and right shoulder(s) and low back pain radiating down right lower extremity. The pain is characterized by aching, throbbing, feels like something is pulling in her neck, worse at night when she is trying to sleep, muscle spasms in back, having migraine headaches with sensitivity to light and sound, reports nausea, no vomiting, headaches are hormonal and stable with Ubrelvy. The severity of the pain is rated 5-6 /10 on the pain scale. The pain occurred > 1 year ago). The context of the pain: occurred was sleeping and denies injury. Exacerbating factors consist of sleeping. Relieving factors consist of none. Associated symptoms consist of denies decreased range of motion, denies joint instability, denies joint stiffness, denies loss of function, denies numbness and denies tingling. Prior treatment consists of none. She has tried massages, OTC ointment, OTC Tylenol/Motrin, Voltaren Gel with some resolution of symptoms. She had MRI C-spine and L-spine that showed DJD in 2023, hasn't had MRI since that time, and she has tried PT in the past without success, she is not interested in pain management or Neurosurgery referral at this time. She denies heavy snoring and witnessed apnea, she is not interested in sleep study.   - She has chest pain at times, currently asymptomatic.   - She has anemia, history of uterine fibroids, currently asymptomatic, worse during menstrual cycle, followed by GYN (Dr. Ramirez). She would repeat labs.   - Patient is without any other complaints today.           -------------------------------------    Cervical radiculopathy    Migraine  without aura and responsive to treatment    Obesity    Vitamin D deficiency        Past Surgical History:   Procedure Laterality Date     SECTION       SECTION      HERNIA REPAIR      OOPHORECTOMY      TUBAL LIGATION         Review of patient's allergies indicates:  No Known Allergies    Outpatient Medications Marked as Taking for the 25 encounter (Office Visit) with Akilah Olivera MD   Medication Sig Dispense Refill    ascorbic acid, vitamin C, (VITAMIN C) 500 MG tablet Take 500 mg by mouth once daily.      cholecalciferol, vitamin D3, (VITAMIN D3) 50 mcg (2,000 unit) Cap capsule Take 2,000 Int'l Units by mouth once daily.      diclofenac sodium (VOLTAREN) 1 % Gel Apply 2 g topically 4 (four) times daily as needed (pain/inflammation). 100 g 1    magnesium 30 mg Tab Take by mouth nightly as needed.      multivitamin with folic acid 400 mcg Tab Take 1 tablet by mouth once daily.      omega-3 fatty acids/fish oil (FISH OIL-OMEGA-3 FATTY ACIDS) 300-1,000 mg capsule Take 2 g by mouth once daily.      ubrogepant (UBRELVY) 50 mg tablet Take 1 tablet (50 mg total) by mouth as needed for Migraine. If symptoms persist or return, may repeat dose after 2 hours. Maximum: 200 mg per 24 hours 10 tablet 2       Social History[1]     Family History   Problem Relation Name Age of Onset    Hypertension Mother Mom     Osteoarthritis Mother Mom     Vaginal cancer Mother Mom 61    Arthritis Mother Mom     Cancer Mother Mom     Breast cancer Sister pat 1/2 sister 44    Hypertension Brother Brother     Heart disease Brother Brother     Hypertension Brother Bro     Breast cancer Maternal Aunt  45    Lung cancer Maternal Aunt      Ovarian cancer Maternal Aunt      Cancer Maternal Aunt Aunt         Subjective:       Review of Systems:    See HPI for details    Constitutional: Denies Change in appetite. Denies Chills. Denies Fever. Denies Night sweats.  Eye: Denies Blurred vision. Denies Discharge. Denies Eye  pain.  ENT: Denies Decreased hearing. Denies Sore throat. Denies Swollen glands.  Respiratory: Denies Cough. Denies Shortness of breath. Denies Shortness of breath with exertion. Denies Wheezing.  Cardiovascular: Reports Chest pain at rest. Reports Chest pain with exertion. Denies Irregular heartbeat. Denies Palpitations.  Gastrointestinal: Denies Abdominal pain. Denies Diarrhea. Denies Nausea. Denies Vomiting. Denies Hematemesis or Hematochezia.  Genitourinary: Denies Dysuria. Denies Urinary frequency. Denies Urinary urgency. Denies Blood in urine.  Endocrine: Denies Cold intolerance. Denies Excessive thirst. Denies Heat intolerance. Denies Weight loss. Denies Weight gain.  Musculoskeletal: Reports Painful joints. Denies Weakness.  Integumentary: Denies Rash. Denies Itching. Denies Dry skin.  Neurologic: Denies Dizziness. Denies Fainting. Denies Headache.  Psychiatric: Denies Depression. Denies Anxiety. Denies Suicidal/Homicidal ideations.    All Other ROS: Negative except as stated in HPI.       Objective:     /84 (BP Location: Right arm, Patient Position: Sitting)   Pulse 76   Temp 97.8 °F (36.6 °C) (Oral)   Resp 16   Ht 5' (1.524 m)   Wt 91.2 kg (201 lb)   LMP 02/06/2025 (Approximate)   SpO2 95%   BMI 39.26 kg/m²     Physical Exam    General: Alert and oriented, No acute distress.  Head: Normocephalic, Atraumatic.  Eye: Pupils are equal, round and reactive to light, Extraocular movements are intact, Sclera non-icteric.  Ears/Nose/Throat: Normal, Mucosa moist,Clear.  Neck/Thyroid: Supple, Non-tender, No carotid bruit, No palpable thyromegaly or thyroid nodule, No lymphadenopathy, No JVD, Full range of motion.  Respiratory: Clear to auscultation bilaterally; No wheezes, rales or rhonchi,Non-labored respirations, Symmetrical chest wall expansion.  Cardiovascular: Regular rate and rhythm, S1/S2 normal, No murmurs, rubs or gallops.  Gastrointestinal: Soft, Non-tender, Non-distended, Normal bowel sounds,  No palpable organomegaly.  Musculoskeletal: Normal range of motion. C-spine and L-spine with mild TTP, no erythema, no effusion, no deformity, FROM.   Integumentary: Warm, Dry, Intact, No suspicious lesions or rashes.  Extremities: No clubbing, cyanosis or edema  Neurologic: No focal deficits, Cranial Nerves II-XII are grossly intact, Motor strength normal upper and lower extremities, Sensory exam intact.  Psychiatric: Normal interaction, Coherent speech, Euthymic mood, Appropriate affect     *MRI C-spine and L-spine results from 09/08/2023 were reviewed and discussed with patient and patient voices understanding.*      Assessment:       ICD-10-CM ICD-9-CM   1. Osteoarthritis of spine with radiculopathy, cervical region  M47.22 721.0   2. Osteoarthritis of spine with radiculopathy, lumbar region  M47.26 721.3   3. Chronic pain of multiple joints  M25.50 719.49    G89.29 338.29   4. Dorsalgia, unspecified  M54.9 724.5   5. Chest pain, unspecified type  R07.9 786.50   6. Anemia, unspecified type  D64.9 285.9        Plan:     Problem List Items Addressed This Visit          Neuro    Osteoarthritis of spine with radiculopathy, cervical region - Primary    Relevant Orders    MRI Lumbar Spine Without Contrast    ANTINUCLEAR ANTIBODIES COMPREHENSIVE PANEL    CYCLIC CITRULLINATED PEPTIDE (CCP) ANTIBODY    RHEUMATOID FACTOR IGA    RHEUMATOID FACTOR IGM    Sedimentation rate    C-Reactive Protein    C3 Complement    C4 Complement    HLA B27 Antigen    Uric Acid    CK     Other Visit Diagnoses         Osteoarthritis of spine with radiculopathy, lumbar region        Relevant Orders    MRI Cervical Spine Without Contrast    ANTINUCLEAR ANTIBODIES COMPREHENSIVE PANEL    CYCLIC CITRULLINATED PEPTIDE (CCP) ANTIBODY    RHEUMATOID FACTOR IGA    RHEUMATOID FACTOR IGM    Sedimentation rate    C-Reactive Protein    C3 Complement    C4 Complement    HLA B27 Antigen    Uric Acid    CK      Chronic pain of multiple joints        Relevant  Orders    ANTINUCLEAR ANTIBODIES COMPREHENSIVE PANEL    CYCLIC CITRULLINATED PEPTIDE (CCP) ANTIBODY    RHEUMATOID FACTOR IGA    RHEUMATOID FACTOR IGM    Sedimentation rate    C-Reactive Protein    C3 Complement    C4 Complement    HLA B27 Antigen    Uric Acid    CK      Dorsalgia, unspecified        Relevant Orders    MRI Lumbar Spine Without Contrast      Chest pain, unspecified type        Relevant Orders    SCHEDULED EKG 12-LEAD (to Muse)      Anemia, unspecified type        Relevant Orders    CBC Auto Differential    Iron and TIBC    Ferritin    Folate    Vitamin B12         1. Osteoarthritis of spine with radiculopathy, cervical region  - MRI Lumbar Spine Without Contrast; Future  - ANTINUCLEAR ANTIBODIES COMPREHENSIVE PANEL; Future  - CYCLIC CITRULLINATED PEPTIDE (CCP) ANTIBODY; Future  - RHEUMATOID FACTOR IGA; Future  - RHEUMATOID FACTOR IGM; Future  - Sedimentation rate; Future  - C-Reactive Protein; Future  - C3 Complement; Future  - C4 Complement; Future  - HLA B27 Antigen; Future  - Uric Acid; Future  - CK; Future  - Will update MRI C-spine and L-spine and treat pending results. Continue massage and stretching exercises. Patient is not interested in trial of medication management at this time.Notify M.D. or ER if symptoms persist or worsen, temp >100.4, or any acute illness.      2. Osteoarthritis of spine with radiculopathy, lumbar region  - MRI Cervical Spine Without Contrast; Future  - ANTINUCLEAR ANTIBODIES COMPREHENSIVE PANEL; Future  - CYCLIC CITRULLINATED PEPTIDE (CCP) ANTIBODY; Future  - RHEUMATOID FACTOR IGA; Future  - RHEUMATOID FACTOR IGM; Future  - Sedimentation rate; Future  - C-Reactive Protein; Future  - C3 Complement; Future  - C4 Complement; Future  - HLA B27 Antigen; Future  - Uric Acid; Future  - CK; Future  - Will update MRI C-spine and L-spine and treat pending results. Continue massage and stretching exercises. Patient is not interested in trial of medication management at this  time.Notify M.D. or ER if symptoms persist or worsen, temp >100.4, or any acute illness.  .      3. Chronic pain of multiple joints  - ANTINUCLEAR ANTIBODIES COMPREHENSIVE PANEL; Future  - CYCLIC CITRULLINATED PEPTIDE (CCP) ANTIBODY; Future  - RHEUMATOID FACTOR IGA; Future  - RHEUMATOID FACTOR IGM; Future  - Sedimentation rate; Future  - C-Reactive Protein; Future  - C3 Complement; Future  - C4 Complement; Future  - HLA B27 Antigen; Future  - Uric Acid; Future  - CK; Future  - Will update MRI C-spine and L-spine and treat pending results. Continue massage and stretching exercises. Patient is not interested in trial of medication management at this time.Notify M.D. or ER if symptoms persist or worsen, temp >100.4, or any acute illness.      4. Dorsalgia, unspecified  - MRI Lumbar Spine Without Contrast; Future  - Will update MRI C-spine and L-spine and treat pending results. Continue massage and stretching exercises. Patient is not interested in trial of medication management at this time.Notify M.D. or ER if symptoms persist or worsen, temp >100.4, or any acute illness.      5. Chest pain, unspecified type, asymptomatic  - SCHEDULED EKG 12-LEAD (to Muse); Future; will treat pending results. Avoid caffeine. Notify M.D. or ER if chest pain, palpitations, headache, SOB, temp greater than 100.4, or any acute illness.   Continue  Low Sodium Diet (DASH Diet - Less than 2 grams of sodium per day).  Monitor blood pressure daily and log. Report consistent numbers greater than 140/90.  Smoking cessation encouraged to aid in BP reduction.  Maintain healthy weight with goal BMI <30. Exercise 30 minutes per day, 5 days per week.      6. Anemia, unspecified type  - CBC Auto Differential; Future  - Iron and TIBC; Future  - Ferritin; Future  - Folate; Future  - Vitamin B12; Future  - Continue followup with GYN for management of uterine fibroids. Iron rich diet encouraged. Will treat pending results. Notify M.D. or ER if symptoms  persist or worsen, active bleeding, temp >100.4, or any acute illness.        Leta was seen today for follow-up.    Diagnoses and all orders for this visit:    Osteoarthritis of spine with radiculopathy, cervical region  -     MRI Lumbar Spine Without Contrast; Future  -     ANTINUCLEAR ANTIBODIES COMPREHENSIVE PANEL; Future  -     CYCLIC CITRULLINATED PEPTIDE (CCP) ANTIBODY; Future  -     RHEUMATOID FACTOR IGA; Future  -     RHEUMATOID FACTOR IGM; Future  -     Sedimentation rate; Future  -     C-Reactive Protein; Future  -     C3 Complement; Future  -     C4 Complement; Future  -     HLA B27 Antigen; Future  -     Uric Acid; Future  -     CK; Future    Osteoarthritis of spine with radiculopathy, lumbar region  -     MRI Cervical Spine Without Contrast; Future  -     ANTINUCLEAR ANTIBODIES COMPREHENSIVE PANEL; Future  -     CYCLIC CITRULLINATED PEPTIDE (CCP) ANTIBODY; Future  -     RHEUMATOID FACTOR IGA; Future  -     RHEUMATOID FACTOR IGM; Future  -     Sedimentation rate; Future  -     C-Reactive Protein; Future  -     C3 Complement; Future  -     C4 Complement; Future  -     HLA B27 Antigen; Future  -     Uric Acid; Future  -     CK; Future    Chronic pain of multiple joints  -     ANTINUCLEAR ANTIBODIES COMPREHENSIVE PANEL; Future  -     CYCLIC CITRULLINATED PEPTIDE (CCP) ANTIBODY; Future  -     RHEUMATOID FACTOR IGA; Future  -     RHEUMATOID FACTOR IGM; Future  -     Sedimentation rate; Future  -     C-Reactive Protein; Future  -     C3 Complement; Future  -     C4 Complement; Future  -     HLA B27 Antigen; Future  -     Uric Acid; Future  -     CK; Future    Dorsalgia, unspecified  -     MRI Lumbar Spine Without Contrast; Future    Chest pain, unspecified type  -     SCHEDULED EKG 12-LEAD (to Muse); Future    Anemia, unspecified type  -     CBC Auto Differential; Future  -     Iron and TIBC; Future  -     Ferritin; Future  -     Folate; Future  -     Vitamin B12; Future          Medication List with  Changes/Refills   Current Medications    ASCORBIC ACID, VITAMIN C, (VITAMIN C) 500 MG TABLET    Take 500 mg by mouth once daily.       Start Date: --        End Date: --    CHOLECALCIFEROL, VITAMIN D3, (VITAMIN D3) 50 MCG (2,000 UNIT) CAP CAPSULE    Take 2,000 Int'l Units by mouth once daily.       Start Date: --        End Date: --    DICLOFENAC SODIUM (VOLTAREN) 1 % GEL    Apply 2 g topically 4 (four) times daily as needed (pain/inflammation).       Start Date: 9/8/2023  End Date: --    MAGNESIUM 30 MG TAB    Take by mouth nightly as needed.       Start Date: --        End Date: --    MULTIVITAMIN WITH FOLIC ACID 400 MCG TAB    Take 1 tablet by mouth once daily.       Start Date: --        End Date: --    OMEGA-3 FATTY ACIDS/FISH OIL (FISH OIL-OMEGA-3 FATTY ACIDS) 300-1,000 MG CAPSULE    Take 2 g by mouth once daily.       Start Date: --        End Date: --    UBROGEPANT (UBRELVY) 50 MG TABLET    Take 1 tablet (50 mg total) by mouth as needed for Migraine. If symptoms persist or return, may repeat dose after 2 hours. Maximum: 200 mg per 24 hours       Start Date: 9/18/2024 End Date: --          Follow up keep appointment as schediuled or sooner if symptoms worsen or fail to improve.          [1]   Social History  Socioeconomic History    Marital status: Single   Tobacco Use    Smoking status: Never    Smokeless tobacco: Never   Substance and Sexual Activity    Alcohol use: Not Currently    Drug use: Never    Sexual activity: Yes     Partners: Male     Birth control/protection: None     Social Drivers of Health     Financial Resource Strain: Low Risk  (8/31/2023)    Overall Financial Resource Strain ([facility])     Difficulty of Paying Living Expenses: Not hard at all   Food Insecurity: No Food Insecurity (8/31/2023)    Hunger Vital Sign     Worried About Running Out of Food in the Last Year: Never true     Ran Out of Food in the Last Year: Never true   Transportation Needs: No Transportation Needs (8/31/2023)     PRAPARE - Transportation     Lack of Transportation (Medical): No     Lack of Transportation (Non-Medical): No   Physical Activity: Sufficiently Active (8/31/2023)    Exercise Vital Sign     Days of Exercise per Week: 4 days     Minutes of Exercise per Session: 60 min   Housing Stability: Low Risk  (8/31/2023)    Housing Stability Vital Sign     Unable to Pay for Housing in the Last Year: No     Number of Places Lived in the Last Year: 1     Unstable Housing in the Last Year: No

## 2025-03-07 ENCOUNTER — LAB VISIT (OUTPATIENT)
Dept: LAB | Facility: HOSPITAL | Age: 40
End: 2025-03-07
Attending: FAMILY MEDICINE
Payer: COMMERCIAL

## 2025-03-07 ENCOUNTER — RESULTS FOLLOW-UP (OUTPATIENT)
Dept: FAMILY MEDICINE | Facility: CLINIC | Age: 40
End: 2025-03-07

## 2025-03-07 DIAGNOSIS — G89.29 CHRONIC PAIN OF MULTIPLE JOINTS: ICD-10-CM

## 2025-03-07 DIAGNOSIS — M47.22 OSTEOARTHRITIS OF SPINE WITH RADICULOPATHY, CERVICAL REGION: ICD-10-CM

## 2025-03-07 DIAGNOSIS — D18.09 VERTEBRAL BODY HEMANGIOMA: ICD-10-CM

## 2025-03-07 DIAGNOSIS — M25.50 CHRONIC PAIN OF MULTIPLE JOINTS: ICD-10-CM

## 2025-03-07 DIAGNOSIS — M47.26 OSTEOARTHRITIS OF SPINE WITH RADICULOPATHY, LUMBAR REGION: ICD-10-CM

## 2025-03-07 DIAGNOSIS — D64.9 ANEMIA, UNSPECIFIED TYPE: ICD-10-CM

## 2025-03-07 DIAGNOSIS — E61.1 IRON DEFICIENCY: Primary | ICD-10-CM

## 2025-03-07 LAB
BASOPHILS # BLD AUTO: 0.05 X10(3)/MCL
BASOPHILS NFR BLD AUTO: 0.7 %
C3 SERPL-MCNC: 140 MG/DL (ref 80–173)
C4 SERPL-MCNC: 30 MG/DL (ref 13–46)
CK SERPL-CCNC: 155 U/L (ref 29–168)
CRP SERPL-MCNC: 3 MG/L
EOSINOPHIL # BLD AUTO: 0.18 X10(3)/MCL (ref 0–0.9)
EOSINOPHIL NFR BLD AUTO: 2.6 %
ERYTHROCYTE [DISTWIDTH] IN BLOOD BY AUTOMATED COUNT: 14.3 % (ref 11.5–17)
ERYTHROCYTE [SEDIMENTATION RATE] IN BLOOD: 12 MM/HR (ref 0–20)
FERRITIN SERPL-MCNC: 22.61 NG/ML (ref 4.63–204)
FOLATE SERPL-MCNC: 13 NG/ML (ref 7–31.4)
HCT VFR BLD AUTO: 37.9 % (ref 37–47)
HGB BLD-MCNC: 12 G/DL (ref 12–16)
IMM GRANULOCYTES # BLD AUTO: 0.03 X10(3)/MCL (ref 0–0.04)
IMM GRANULOCYTES NFR BLD AUTO: 0.4 %
IRON SATN MFR SERPL: 15 % (ref 20–50)
IRON SERPL-MCNC: 37 UG/DL (ref 50–170)
LYMPHOCYTES # BLD AUTO: 1.97 X10(3)/MCL (ref 0.6–4.6)
LYMPHOCYTES NFR BLD AUTO: 28.3 %
MCH RBC QN AUTO: 25.4 PG (ref 27–31)
MCHC RBC AUTO-ENTMCNC: 31.7 G/DL (ref 33–36)
MCV RBC AUTO: 80.1 FL (ref 80–94)
MONOCYTES # BLD AUTO: 0.48 X10(3)/MCL (ref 0.1–1.3)
MONOCYTES NFR BLD AUTO: 6.9 %
NEUTROPHILS # BLD AUTO: 4.24 X10(3)/MCL (ref 2.1–9.2)
NEUTROPHILS NFR BLD AUTO: 61.1 %
NRBC BLD AUTO-RTO: 0 %
PLATELET # BLD AUTO: 320 X10(3)/MCL (ref 130–400)
PMV BLD AUTO: 12 FL (ref 7.4–10.4)
RBC # BLD AUTO: 4.73 X10(6)/MCL (ref 4.2–5.4)
TIBC SERPL-MCNC: 217 UG/DL (ref 70–310)
TIBC SERPL-MCNC: 254 UG/DL (ref 250–450)
TRANSFERRIN SERPL-MCNC: 229 MG/DL (ref 180–382)
URATE SERPL-MCNC: 4.5 MG/DL (ref 2.6–6)
VIT B12 SERPL-MCNC: 637 PG/ML (ref 213–816)
WBC # BLD AUTO: 6.95 X10(3)/MCL (ref 4.5–11.5)

## 2025-03-07 PROCEDURE — 86160 COMPLEMENT ANTIGEN: CPT

## 2025-03-07 PROCEDURE — 82728 ASSAY OF FERRITIN: CPT

## 2025-03-07 PROCEDURE — 82746 ASSAY OF FOLIC ACID SERUM: CPT

## 2025-03-07 PROCEDURE — 86812 HLA TYPING A B OR C: CPT

## 2025-03-07 PROCEDURE — 82550 ASSAY OF CK (CPK): CPT

## 2025-03-07 PROCEDURE — 85025 COMPLETE CBC W/AUTO DIFF WBC: CPT

## 2025-03-07 PROCEDURE — 82607 VITAMIN B-12: CPT

## 2025-03-07 PROCEDURE — 86235 NUCLEAR ANTIGEN ANTIBODY: CPT

## 2025-03-07 PROCEDURE — 86431 RHEUMATOID FACTOR QUANT: CPT | Mod: 59

## 2025-03-07 PROCEDURE — 86431 RHEUMATOID FACTOR QUANT: CPT

## 2025-03-07 PROCEDURE — 85652 RBC SED RATE AUTOMATED: CPT

## 2025-03-07 PROCEDURE — 86200 CCP ANTIBODY: CPT

## 2025-03-07 PROCEDURE — 86140 C-REACTIVE PROTEIN: CPT

## 2025-03-07 PROCEDURE — 36415 COLL VENOUS BLD VENIPUNCTURE: CPT

## 2025-03-07 PROCEDURE — 83550 IRON BINDING TEST: CPT

## 2025-03-07 PROCEDURE — 84550 ASSAY OF BLOOD/URIC ACID: CPT

## 2025-03-08 LAB — CYCLIC CITRULLINATED PEPTIDE (CCP) (OHS): 1.3 U/ML

## 2025-03-10 LAB
ANTINUCLEAR ANTIBODY SCREEN (OHS): NEGATIVE
CENTROMERE QUANT (OHS): <0.4 U/ML
DSDNA AB QUANT (OHS): 1.6 IU/ML
HLA TYP COMM-IMP: NORMAL
HLA-B27 QL FC: NEGATIVE
JO-1 AB QUANT (OHS): <0.3 U/ML
RHEUMATOID FACTOR IGA QUANTITATIVE (OLG): 5.1 IU/ML
RHEUMATOID FACTOR IGM QUANTITATIVE (OLG): 0.7 IU/ML
RNP70 AB QUANT (OHS): 1.8 U/ML
SCL-70S AB QUANT (OHS): 0.8 U/ML
SMITH AB QUANT (OHS): <0.7 U/ML
SSA(RO) AB QUANT (OHS): 0.6 U/ML
SSB(LA) AB QUANT (OHS): <0.4 U/ML
U1RNP AB QUANT (OHS): 1.6 U/ML

## 2025-03-10 RX ORDER — FERROUS GLUCONATE 324(38)MG
324 TABLET ORAL 2 TIMES DAILY WITH MEALS
Qty: 60 TABLET | Refills: 1 | Status: SHIPPED | OUTPATIENT
Start: 2025-03-10 | End: 2025-05-09

## 2025-03-11 ENCOUNTER — TELEPHONE (OUTPATIENT)
Dept: FAMILY MEDICINE | Facility: CLINIC | Age: 40
End: 2025-03-11
Payer: COMMERCIAL

## 2025-03-11 NOTE — TELEPHONE ENCOUNTER
----- Message from Akilah Olivera MD sent at 3/10/2025  6:27 PM CDT -----  Iron level is low, 37, normal: , this is most likely due to her history of uterine fibroids, needs to follow an iron rich diet and I sent a prescription for an iron supplement to take twice   daily with food, recheck CBC and iron panel in 4-6 weeks, orders are in file.     Iron-Rich Foods   Iron is an essential mineral that plays a vital role in oxygen transport in the body. Here are some iron-rich foods to include in your diet:   Animal Sources:   Red meat (beef, lamb, pork)  Poultry (chicken, turkey)  Seafood (oysters, clams, mussels)  Eggs  Liver   Plant Sources:  Beans (kidney, lentils, chickpeas)  Dark leafy greens (spinach, kale, saira greens)  Nuts and seeds (cashews, almonds, pumpkin seeds)  Whole grains (brown rice, quinoa)  Dried fruits (raisins, apricots)  Tofu   Other Sources:  iron-fortified cereals and molasses.   Tips for Maximizing Iron Absorption:   Consume iron-rich foods with vitamin C-rich foods (e.g., citrus fruits, tomatoes).   Avoid consuming iron-rich foods with calcium-rich foods (e.g., milk, cheese) at the same time.   Consider taking an iron supplement if you have an iron deficiency or are at risk of one.     - Testing is negative for auto-immune disease and rheumatoid arthritis.     - Vitamin B12, folate, and ferritin levels are normal.     - Remaining labs are normal.     Thank you for choosing [facility] for your medical needs. Have a great day!   -Akilah Olivera MD, FAAFP    ----- Message -----  From: Lab, Background User  Sent: 3/7/2025   1:40 PM CDT  To: Akilah Olivera MD

## 2025-03-12 ENCOUNTER — TELEPHONE (OUTPATIENT)
Dept: FAMILY MEDICINE | Facility: CLINIC | Age: 40
End: 2025-03-12
Payer: COMMERCIAL

## 2025-03-12 PROBLEM — M47.26 OSTEOARTHRITIS OF SPINE WITH RADICULOPATHY, LUMBAR REGION: Status: ACTIVE | Noted: 2025-03-12

## 2025-03-12 PROBLEM — D18.09 VERTEBRAL BODY HEMANGIOMA: Status: ACTIVE | Noted: 2025-03-12

## 2025-03-12 RX ORDER — CYCLOBENZAPRINE HCL 5 MG
5 TABLET ORAL NIGHTLY PRN
Qty: 30 TABLET | Refills: 1 | Status: SHIPPED | OUTPATIENT
Start: 2025-03-12

## 2025-03-12 RX ORDER — DICLOFENAC SODIUM 75 MG/1
75 TABLET, DELAYED RELEASE ORAL 2 TIMES DAILY PRN
Qty: 60 TABLET | Refills: 1 | Status: SHIPPED | OUTPATIENT
Start: 2025-03-12 | End: 2025-05-11

## 2025-03-12 NOTE — TELEPHONE ENCOUNTER
----- Message from Akilah Olivera MD sent at 3/12/2025  9:36 AM CDT -----  MRI C-spine (neck) shows relatively diffuse cervical spine disc disease from C3-C4 through C6-C7, with evidence of muscle spasm. Arthritis of the neck, is a general term for wear and tear that   affects your cervical spine. If you have cervical disc disease, your neck may ache, hurt or feel stiff. It's very common and a natural consequence of growing older. As we age, our spines start to   change and wear out. The best treatment is one that helps ease your symptoms. In general, healthcare providers start treatment with noninvasive, conservative treatments, for example a trial of oral   medications for pain relief and/or physical therapy. If the pain persist despite conservative measures, then we proceed with referral to pain management for evaluation for epidural injections and   last resort would be to consult a neurosurgery for evaluation for spinal surgery.      - I placed an order for a trial of an anti-inflammatory medication (Diclofenac) to take as directed with food as well as muscle relaxer to take nightly called Flexeril. I also placed a referral for   Physical Therapy. If no improvement with conservative measures, will proceed with pain management referral.     Thank you for choosing [facility] for your medical needs. Have a great day!   -Akilah Olivera MD, [facility]    ----- Message -----  From: Interface, Rad Results In  Sent: 3/12/2025   7:36 AM CDT  To: Akilah Olivera MD

## 2025-03-12 NOTE — TELEPHONE ENCOUNTER
"----- Message from Akilah Olivera MD sent at 3/12/2025  9:41 AM CDT -----  MRI L-spine (low back) shows     - A small vertebral hemangioma   A vertebral hemangioma is a benign (non-cancerous) tumor that grows in the bones of the spine (vertebrae). It is made up of blood vessels.   Causes:   The exact cause of vertebral hemangiomas is unknown. They are believed to be a malformation of blood vessels that occurs during fetal development.   Symptoms:  Most vertebral hemangiomas are asymptomatic and are found incidentally on imaging tests. In some cases, they can cause symptoms such as:   Back pain (usually mild and chronic), Neck pain, Numbness or tingling in the arms or legs, and Weakness.   Diagnosis:  Vertebral hemangiomas are typically diagnosed using imaging tests such as:   X-ray, CT scan, and MRI scan.   The appearance of a vertebral hemangioma on imaging is often described as a "corduroy cloth" pattern due to the thickened trabeculae (bone bars) that support the blood vessels.   Treatment:  Most vertebral hemangiomas do not require treatment. However, in some cases, treatment may be necessary if the hemangioma is causing symptoms or is at risk of causing complications. Treatment options   include:   Observation: If the hemangioma is asymptomatic and not growing, it may be monitored regularly.   Surgery: Surgery may be performed to remove the hemangioma if it is causing significant pain or other symptoms.   Vertebroplasty or kyphoplasty: These procedures involve injecting cement or bone particles into the affected vertebra to stabilize it and relieve pain.   Radiation therapy: Radiation therapy may be used to shrink the hemangioma and reduce symptoms.     - Diffuse mild arthritis changes throughout with moderate arthritis changes at L3-L4 and L4-L5 levels.     - Trial of conservative management with medication and physical therapy as recommended for neck pain. If no improvement with conservative measures, " will proceed with pain management referral.     Thank you for choosing [facility] for your medical needs. Have a great day!   -Akilah Olivera MD, [facility]    ----- Message -----  From: Interface, Rad Results In  Sent: 3/12/2025   7:40 AM CDT  To: Akilah Olivera MD

## 2025-07-24 ENCOUNTER — HOSPITAL ENCOUNTER (OUTPATIENT)
Dept: RADIOLOGY | Facility: HOSPITAL | Age: 40
Discharge: HOME | End: 2025-07-24
Payer: COMMERCIAL

## 2025-07-24 DIAGNOSIS — Z12.31 SCREENING MAMMOGRAM FOR BREAST CANCER: ICD-10-CM

## 2025-07-24 DIAGNOSIS — Z80.3 FAMILY HISTORY OF BREAST CANCER: ICD-10-CM

## 2025-07-24 DIAGNOSIS — Z12.39 BREAST CANCER SCREENING, HIGH RISK PATIENT: ICD-10-CM

## 2025-07-24 DIAGNOSIS — Z91.89 AT HIGH RISK FOR BREAST CANCER: ICD-10-CM

## 2025-07-24 PROCEDURE — 77063 BREAST TOMOSYNTHESIS BI: CPT | Mod: TC

## 2025-09-04 ENCOUNTER — TELEPHONE (OUTPATIENT)
Dept: FAMILY MEDICINE | Facility: CLINIC | Age: 40
End: 2025-09-04
Payer: COMMERCIAL

## 2025-09-04 DIAGNOSIS — Z00.00 WELLNESS EXAMINATION: Primary | ICD-10-CM
